# Patient Record
Sex: MALE | Employment: FULL TIME | URBAN - METROPOLITAN AREA
[De-identification: names, ages, dates, MRNs, and addresses within clinical notes are randomized per-mention and may not be internally consistent; named-entity substitution may affect disease eponyms.]

---

## 2017-11-07 ENCOUNTER — OFFICE VISIT (OUTPATIENT)
Dept: URGENT CARE | Facility: CLINIC | Age: 37
End: 2017-11-07
Payer: COMMERCIAL

## 2017-11-07 VITALS
WEIGHT: 275 LBS | SYSTOLIC BLOOD PRESSURE: 118 MMHG | HEIGHT: 73 IN | HEART RATE: 74 BPM | BODY MASS INDEX: 36.45 KG/M2 | OXYGEN SATURATION: 96 % | DIASTOLIC BLOOD PRESSURE: 76 MMHG | TEMPERATURE: 98 F | RESPIRATION RATE: 20 BRPM

## 2017-11-07 DIAGNOSIS — R05.9 COUGH: Primary | ICD-10-CM

## 2017-11-07 DIAGNOSIS — J06.9 UPPER RESPIRATORY TRACT INFECTION, UNSPECIFIED TYPE: ICD-10-CM

## 2017-11-07 LAB
CTP QC/QA: YES
CTP QC/QA: YES
FLUAV AG NPH QL: NEGATIVE
FLUBV AG NPH QL: NEGATIVE
S PYO RRNA THROAT QL PROBE: NEGATIVE

## 2017-11-07 PROCEDURE — 99203 OFFICE O/P NEW LOW 30 MIN: CPT | Mod: 25,S$GLB,, | Performed by: EMERGENCY MEDICINE

## 2017-11-07 PROCEDURE — 96372 THER/PROPH/DIAG INJ SC/IM: CPT | Mod: S$GLB,,, | Performed by: EMERGENCY MEDICINE

## 2017-11-07 PROCEDURE — 87804 INFLUENZA ASSAY W/OPTIC: CPT | Mod: QW,S$GLB,, | Performed by: EMERGENCY MEDICINE

## 2017-11-07 PROCEDURE — 87880 STREP A ASSAY W/OPTIC: CPT | Mod: QW,S$GLB,, | Performed by: EMERGENCY MEDICINE

## 2017-11-07 RX ORDER — BETAMETHASONE SODIUM PHOSPHATE AND BETAMETHASONE ACETATE 3; 3 MG/ML; MG/ML
6 INJECTION, SUSPENSION INTRA-ARTICULAR; INTRALESIONAL; INTRAMUSCULAR; SOFT TISSUE ONCE
Status: COMPLETED | OUTPATIENT
Start: 2017-11-07 | End: 2017-11-07

## 2017-11-07 RX ADMIN — BETAMETHASONE SODIUM PHOSPHATE AND BETAMETHASONE ACETATE 6 MG: 3; 3 INJECTION, SUSPENSION INTRA-ARTICULAR; INTRALESIONAL; INTRAMUSCULAR; SOFT TISSUE at 09:11

## 2017-11-07 NOTE — PROGRESS NOTES
"Subjective:       Patient ID: Calixto Clayton is a 37 y.o. male.    Vitals:  height is 6' 1" (1.854 m) and weight is 124.7 kg (275 lb). His oral temperature is 97.8 °F (36.6 °C). His blood pressure is 118/76 and his pulse is 74. His respiration is 20 and oxygen saturation is 96%.     Chief Complaint: Sore Throat    Cough, sore throat, headaches and body aches x 4 days.  Cough production sputum is yellow in color.  Pt has tried mucinex, dayquil and ibuprofen otc with mild relief.  Pt reports no fever, n/v/d.  Pt has not had flu vaccine this year.  He would like a steroid shot. Doesn't need cough meds. He is taking OTC meds. He is here with his kids.      Sore Throat    This is a new problem. The current episode started in the past 7 days. The problem has been gradually worsening. There has been no fever. The pain is at a severity of 4/10. The pain is mild. Associated symptoms include congestion, coughing, headaches and a hoarse voice. Pertinent negatives include no abdominal pain, ear pain or shortness of breath. He has tried NSAIDs (mucinex, dayquil) for the symptoms.     Review of Systems   Constitution: Negative for chills, fever and malaise/fatigue.   HENT: Positive for congestion, hoarse voice and sore throat. Negative for ear pain.    Eyes: Negative for discharge and redness.   Cardiovascular: Negative for chest pain, dyspnea on exertion and leg swelling.   Respiratory: Positive for cough and sputum production. Negative for shortness of breath and wheezing.    Musculoskeletal: Positive for myalgias.   Gastrointestinal: Negative for abdominal pain and nausea.   Neurological: Positive for headaches.       Objective:      Physical Exam   Constitutional: He is oriented to person, place, and time. He appears well-developed and well-nourished. He is cooperative.  Non-toxic appearance. He does not appear ill. No distress (mild cough).   HENT:   Head: Normocephalic and atraumatic.   Right Ear: Hearing, tympanic " membrane, external ear and ear canal normal.   Left Ear: Hearing, tympanic membrane, external ear and ear canal normal.   Nose: Mucosal edema and rhinorrhea present. No nasal deformity. No epistaxis. Right sinus exhibits no maxillary sinus tenderness and no frontal sinus tenderness. Left sinus exhibits no maxillary sinus tenderness and no frontal sinus tenderness.   Mouth/Throat: Uvula is midline and mucous membranes are normal. No trismus in the jaw. Normal dentition. No uvula swelling. Posterior oropharyngeal erythema present.   Eyes: Conjunctivae, EOM and lids are normal. Pupils are equal, round, and reactive to light. No scleral icterus.   Sclera clear bilat   Neck: Trachea normal, normal range of motion, full passive range of motion without pain and phonation normal. Neck supple.   Cardiovascular: Normal rate, regular rhythm, normal heart sounds, intact distal pulses and normal pulses.    Pulmonary/Chest: Effort normal and breath sounds normal. No respiratory distress.   Abdominal: Soft. Normal appearance and bowel sounds are normal. He exhibits no distension. There is no tenderness.   Musculoskeletal: Normal range of motion. He exhibits no edema or deformity.   Neurological: He is alert and oriented to person, place, and time. He exhibits normal muscle tone. Coordination normal.   Skin: Skin is warm, dry and intact. He is not diaphoretic. No pallor.   Psychiatric: He has a normal mood and affect. His speech is normal and behavior is normal. Judgment and thought content normal. Cognition and memory are normal.   Nursing note and vitals reviewed.      Office Visit on 11/07/2017   Component Date Value Ref Range Status    Rapid Strep A Screen 11/07/2017 Negative  Negative Final     Acceptable 11/07/2017 Yes   Final    Rapid Influenza A Ag 11/07/2017 Negative  Negative Final    Rapid Influenza B Ag 11/07/2017 Negative  Negative Final     Acceptable 11/07/2017 Yes   Final       Assessment:       1. Cough    2. Upper respiratory tract infection, unspecified type        Plan:         Cough  -     POCT rapid strep A  -     POCT Influenza A/B    Upper respiratory tract infection, unspecified type    Other orders  -     betamethasone acetate-betamethasone sodium phosphate injection 6 mg; Inject 1 mL (6 mg total) into the muscle once.

## 2017-11-29 ENCOUNTER — OFFICE VISIT (OUTPATIENT)
Dept: URGENT CARE | Facility: CLINIC | Age: 37
End: 2017-11-29
Payer: COMMERCIAL

## 2017-11-29 VITALS
WEIGHT: 275 LBS | SYSTOLIC BLOOD PRESSURE: 124 MMHG | HEIGHT: 73 IN | TEMPERATURE: 99 F | BODY MASS INDEX: 36.45 KG/M2 | OXYGEN SATURATION: 97 % | DIASTOLIC BLOOD PRESSURE: 78 MMHG | HEART RATE: 66 BPM | RESPIRATION RATE: 16 BRPM

## 2017-11-29 DIAGNOSIS — J02.9 SORE THROAT: Primary | ICD-10-CM

## 2017-11-29 LAB
CTP QC/QA: YES
S PYO RRNA THROAT QL PROBE: NEGATIVE

## 2017-11-29 PROCEDURE — 87880 STREP A ASSAY W/OPTIC: CPT | Mod: QW,S$GLB,, | Performed by: EMERGENCY MEDICINE

## 2017-11-29 PROCEDURE — 99214 OFFICE O/P EST MOD 30 MIN: CPT | Mod: S$GLB,,, | Performed by: EMERGENCY MEDICINE

## 2017-11-29 NOTE — PROGRESS NOTES
Subjective:       Patient ID: Calixto Clayton is a 37 y.o. male.    Vitals:    11/29/17 1642   BP: 124/78   Pulse: 66   Resp: 16   Temp: 98.7 °F (37.1 °C)       Chief Complaint: Sore Throat    Pt states sore throat and congestion x 2 days. Pt.'s 5 yr old daughter was diagnosed with strep today.      Sore Throat    This is a new problem. The current episode started in the past 7 days. The problem has been gradually worsening. The pain is at a severity of 5/10. Associated symptoms include congestion, coughing, a hoarse voice and trouble swallowing. Pertinent negatives include no abdominal pain, ear pain, headaches or shortness of breath. He has had exposure to strep. Treatments tried: sudafed. The treatment provided mild relief.     Review of Systems   Constitution: Negative for chills, fever and malaise/fatigue.   HENT: Positive for congestion, hoarse voice, sore throat and trouble swallowing. Negative for ear pain.    Eyes: Negative for discharge and redness.   Cardiovascular: Negative for chest pain, dyspnea on exertion and leg swelling.   Respiratory: Positive for cough. Negative for shortness of breath, sputum production and wheezing.    Musculoskeletal: Negative for myalgias.   Gastrointestinal: Negative for abdominal pain and nausea.   Neurological: Negative for headaches.       Objective:      Physical Exam   Constitutional: He is oriented to person, place, and time. He appears well-developed and well-nourished. He is cooperative.  Non-toxic appearance. He does not appear ill. No distress.   Mild pain NAD   HENT:   Head: Normocephalic and atraumatic.   Right Ear: Hearing, tympanic membrane, external ear and ear canal normal.   Left Ear: Hearing, tympanic membrane, external ear and ear canal normal.   Nose: No mucosal edema, rhinorrhea or nasal deformity. No epistaxis. Right sinus exhibits no maxillary sinus tenderness and no frontal sinus tenderness. Left sinus exhibits no maxillary sinus tenderness and no  "frontal sinus tenderness.   Mouth/Throat: Uvula is midline and mucous membranes are normal. No trismus in the jaw. Normal dentition. No uvula swelling. Posterior oropharyngeal erythema present. Tonsils are 1+ on the right. Tonsils are 1+ on the left.   Eyes: Conjunctivae, EOM and lids are normal. Pupils are equal, round, and reactive to light. No scleral icterus.   Sclera clear bilat   Neck: Trachea normal, normal range of motion, full passive range of motion without pain and phonation normal. Neck supple.   Cardiovascular: Normal rate, regular rhythm, normal heart sounds, intact distal pulses and normal pulses.    Pulmonary/Chest: Effort normal and breath sounds normal. No respiratory distress.   Abdominal: Soft. Normal appearance and bowel sounds are normal. He exhibits no distension. There is no tenderness.   Musculoskeletal: Normal range of motion. He exhibits no edema or deformity.   Lymphadenopathy:     He has cervical adenopathy.        Right cervical: Superficial cervical adenopathy present.        Left cervical: Superficial cervical adenopathy present.   Neurological: He is alert and oriented to person, place, and time. He exhibits normal muscle tone. Coordination normal.   Skin: Skin is warm, dry and intact. He is not diaphoretic. No pallor.   Psychiatric: He has a normal mood and affect. His speech is normal and behavior is normal. Judgment and thought content normal. Cognition and memory are normal.   Nursing note and vitals reviewed.    /78   Pulse 66   Temp 98.7 °F (37.1 °C)   Resp 16   Ht 6' 1" (1.854 m)   Wt 124.7 kg (275 lb)   SpO2 97%   BMI 36.28 kg/m²    Office Visit on 11/29/2017   Component Date Value Ref Range Status    Rapid Strep A Screen 11/29/2017 Negative  Negative Final     Acceptable 11/29/2017 Yes   Final     Assessment:       1. Sore throat        Plan:       Calixto was seen today for sore throat.    Diagnoses and all orders for this visit:    Sore throat  - "     POCT rapid strep A

## 2017-11-29 NOTE — PATIENT INSTRUCTIONS
Pharyngitis   If your condition worsens or fails to improve we recommend that you receive another evaluation at the ER immediately or contact your PCP to discuss your concerns or return here. You must understand that you've received an urgent care treatment only and that you may be released before all your medical problems are known or treated. You the patient will arrange for followup care as instructed.   The majority of all sore throats or tonsillitis are viral and antibiotics will not treat this.     Tylenol or ibuprofen for pain may help as long as you are not allergic to these meds or have a medical condition such as stomach ulcers, liver or kidney disease or taking blood thinners etc that would   prevent you from using these medications.   Rest and fluids will help as well.   If you were prescribed antibiotics and are female and on BCP use additional methods to prevent pregnancy while on the antibiotics and for one cycle after

## 2022-04-01 ENCOUNTER — HOSPITAL ENCOUNTER (EMERGENCY)
Facility: HOSPITAL | Age: 42
Discharge: HOME | End: 2022-04-01
Attending: EMERGENCY MEDICINE
Payer: COMMERCIAL

## 2022-04-01 VITALS
SYSTOLIC BLOOD PRESSURE: 123 MMHG | DIASTOLIC BLOOD PRESSURE: 63 MMHG | OXYGEN SATURATION: 98 % | WEIGHT: 230 LBS | HEIGHT: 72 IN | TEMPERATURE: 99 F | HEART RATE: 62 BPM | RESPIRATION RATE: 15 BRPM | BODY MASS INDEX: 31.15 KG/M2

## 2022-04-01 DIAGNOSIS — L03.213 PERIORBITAL CELLULITIS OF RIGHT EYE: Primary | ICD-10-CM

## 2022-04-01 PROCEDURE — 99284 PR EMERGENCY DEPT VISIT,LEVEL IV: ICD-10-PCS | Mod: ,,, | Performed by: EMERGENCY MEDICINE

## 2022-04-01 PROCEDURE — 99283 EMERGENCY DEPT VISIT LOW MDM: CPT

## 2022-04-01 PROCEDURE — 99284 EMERGENCY DEPT VISIT MOD MDM: CPT | Mod: ,,, | Performed by: EMERGENCY MEDICINE

## 2022-04-01 PROCEDURE — 25000003 PHARM REV CODE 250: Performed by: EMERGENCY MEDICINE

## 2022-04-01 RX ORDER — AMOXICILLIN AND CLAVULANATE POTASSIUM 875; 125 MG/1; MG/1
1 TABLET, FILM COATED ORAL 2 TIMES DAILY
Qty: 13 TABLET | Refills: 0 | Status: ON HOLD | OUTPATIENT
Start: 2022-04-01 | End: 2022-04-04 | Stop reason: SDUPTHER

## 2022-04-01 RX ORDER — CLINDAMYCIN HYDROCHLORIDE 150 MG/1
300 CAPSULE ORAL
Status: COMPLETED | OUTPATIENT
Start: 2022-04-01 | End: 2022-04-01

## 2022-04-01 RX ORDER — CLINDAMYCIN HYDROCHLORIDE 300 MG/1
300 CAPSULE ORAL EVERY 8 HOURS
Qty: 20 CAPSULE | Refills: 0 | Status: ON HOLD | OUTPATIENT
Start: 2022-04-01 | End: 2022-04-04 | Stop reason: SDUPTHER

## 2022-04-01 RX ORDER — AMOXICILLIN AND CLAVULANATE POTASSIUM 875; 125 MG/1; MG/1
1 TABLET, FILM COATED ORAL
Status: COMPLETED | OUTPATIENT
Start: 2022-04-01 | End: 2022-04-01

## 2022-04-01 RX ADMIN — CLINDAMYCIN HYDROCHLORIDE 300 MG: 150 CAPSULE ORAL at 09:04

## 2022-04-01 RX ADMIN — AMOXICILLIN AND CLAVULANATE POTASSIUM 1 TABLET: 875; 125 TABLET, FILM COATED ORAL at 09:04

## 2022-04-01 NOTE — DISCHARGE INSTRUCTIONS
Return to the ER for any worsening eyelid swelling or pain, eye redness or blurry vision, fevers, or pain with movement of your eye.

## 2022-04-01 NOTE — ED NOTES
Patient identifiers verified and correct for Mr Clayton  C/C: Swelling to right upper eye lid SEE NN  APPEARANCE: awake and alert in NAD.  SKIN: warm, dry and intact. No breakdown or bruising.  MUSCULOSKELETAL: Patient moving all extremities spontaneously, no obvious swelling or deformities noted. Ambulates independently.  RESPIRATORY: Denies shortness of breath.Respirations unlabored.   CARDIAC: Denies CP, 2+ distal pulses; no peripheral edema  ABDOMEN: S/ND/NT, Denies nausea  : voids spontaneously, denies difficulty  Neurologic: AAO x 4; follows commands equal strength in all extremities; denies numbness/tingling. Denies dizziness Deneis wekaness

## 2022-04-01 NOTE — ED NOTES
Discharge home , states understanding to follow up as directed. Ambulates out of ED without difficulty. RX given, Med before disharge

## 2022-04-01 NOTE — ED PROVIDER NOTES
Encounter Date: 4/1/2022       History     Chief Complaint   Patient presents with    Eye Problem     42-year-old male with no significant past medical history presents for evaluation of right upper eyelid swelling and redness that he noticed this morning.  He states he had a few styes on his upper right eyelid and 1 on his lower right eyelid that he noticed 6 days ago, and they were increasing in size since onset.  Last night they started draining, and this morning he noticed diffuse swelling of his right upper eyelid with redness and mild pain.  Denies any blurry vision, fevers, or other complaints.  He is otherwise healthy and takes no medications regularly.  No history of similar previous episodes.        Review of patient's allergies indicates:   Allergen Reactions    Sulfa (sulfonamide antibiotics)      History reviewed. No pertinent past medical history.  Past Surgical History:   Procedure Laterality Date    left forearm surgery      SPINE SURGERY       Family History   Problem Relation Age of Onset    Diabetes Mother      Social History     Tobacco Use    Smoking status: Former Smoker    Smokeless tobacco: Never Used   Substance Use Topics    Alcohol use: Yes     Comment: socially    Drug use: No     Review of Systems   Constitutional: Negative for fever.   HENT: Negative for congestion.    Eyes: Negative for redness.   Respiratory: Negative for shortness of breath.    Cardiovascular: Negative for chest pain.   Gastrointestinal: Negative for abdominal pain.   Genitourinary: Negative for dysuria.   Skin: Negative for rash.   Neurological: Negative for headaches.   Psychiatric/Behavioral: Negative for confusion.       Physical Exam     Initial Vitals [04/01/22 0844]   BP Pulse Resp Temp SpO2   123/63 62 15 99 °F (37.2 °C) 98 %      MAP       --         Physical Exam    Nursing note and vitals reviewed.  Constitutional: He appears well-developed and well-nourished. He is not diaphoretic. No distress.    HENT:   Head: Normocephalic and atraumatic.   Eyes: Conjunctivae and EOM are normal. Pupils are equal, round, and reactive to light. Right eye exhibits no discharge. Left eye exhibits no discharge. No scleral icterus.   Right upper eyelid with diffuse erythema and mild tenderness, no focal fluctuance or sign of abscess.  Right eye with no conjunctival erythema or drainage, full painless EOMI   Neck: Neck supple.   Normal range of motion.  Cardiovascular: Normal rate, regular rhythm, normal heart sounds and intact distal pulses.   No murmur heard.  Pulmonary/Chest: Breath sounds normal. No respiratory distress. He has no wheezes. He has no rhonchi. He has no rales.   Musculoskeletal:         General: No edema. Normal range of motion.      Cervical back: Normal range of motion and neck supple.     Neurological: He is alert and oriented to person, place, and time. He has normal strength. No cranial nerve deficit.   Skin: Skin is warm and dry.   Psychiatric: He has a normal mood and affect.         ED Course   Procedures  Labs Reviewed   HIV 1 / 2 ANTIBODY   HEPATITIS C ANTIBODY          Imaging Results    None          Medications   clindamycin capsule 300 mg (300 mg Oral Given 4/1/22 0940)   amoxicillin-clavulanate 875-125mg per tablet 1 tablet (1 tablet Oral Given 4/1/22 0940)     Medical Decision Making:   Initial Assessment:       Healthy 42-year-old male presents for evaluation of right upper eyelid swelling and redness that he noticed this morning.  He had multiple upper and lower eyelid stye onset about 6 days ago; lower eyelids stye resolved but upper eyelid stye increased in size and started draining last night, with onset of diffuse swelling this morning.  He denies any associated vision changes, eye redness or drainage, or blurry vision.  No fevers or other associated complaints.  On exam patient with diffuse upper eyelid erythema and edema, with no conjunctival erythema, blurry vision, or painful EOMI.   No palpable stye of upper eyelid or active drainage, no focal fluctuance to suggest underlying abscess.    Most likely periorbital cellulitis of upper eyelid, no symptoms or exam findings to suggest orbital cellulitis.  Patient is afebrile and well-appearing with no comorbidities to warrant further emergent workup, no indication for CT or labs at this time.  Per most recent recommendations, will start Augmentin and clindamycin for sinus and MRSA coverage.  Patient understands expected clinical course while taking antibiotics, and to return to the ED for any worsening eyelid swelling or redness, fevers, blurry vision, or pain with movement of the eye.                        Clinical Impression:   Final diagnoses:  [L03.213] Periorbital cellulitis of right eye (Primary)          ED Disposition Condition    Discharge Stable        ED Prescriptions     Medication Sig Dispense Start Date End Date Auth. Provider    amoxicillin-clavulanate 875-125mg (AUGMENTIN) 875-125 mg per tablet Take 1 tablet by mouth 2 (two) times daily. 13 tablet 4/1/2022  Thee Rutledge MD    clindamycin (CLEOCIN) 300 MG capsule Take 1 capsule (300 mg total) by mouth every 8 (eight) hours. 20 capsule 4/1/2022  Thee Rutledge MD        Follow-up Information     Follow up With Specialties Details Why Contact Info    [facility] - Emergency Dept Emergency Medicine Go to  If symptoms worsen [id number]  [facility] [id number] [phone]           Thee Rutledge MD  04/01/22 0921

## 2022-04-01 NOTE — ED NOTES
"Patient staets swelling to top right eyelid onset this am, sensitive to light, satets snoted sty last week, " burst " last night  "

## 2022-04-03 ENCOUNTER — HOSPITAL ENCOUNTER (OUTPATIENT)
Facility: HOSPITAL | Age: 42
Discharge: HOME | End: 2022-04-04
Attending: EMERGENCY MEDICINE | Admitting: INTERNAL MEDICINE
Payer: COMMERCIAL

## 2022-04-03 DIAGNOSIS — R07.9 CHEST PAIN: ICD-10-CM

## 2022-04-03 DIAGNOSIS — L03.213 PRESEPTAL CELLULITIS OF RIGHT EYE: Primary | ICD-10-CM

## 2022-04-03 PROBLEM — H57.89 DISCHARGE OF EYE, RIGHT: Status: ACTIVE | Noted: 2022-04-03

## 2022-04-03 PROBLEM — H57.11 EYE PAIN, RIGHT: Status: ACTIVE | Noted: 2022-04-03

## 2022-04-03 LAB
BASOPHILS # BLD AUTO: 0.02 K/UL (ref 0–0.2)
BASOPHILS NFR BLD: 0.3 % (ref 0–1.9)
BUN SERPL-MCNC: 12 MG/DL (ref 6–30)
CHLORIDE SERPL-SCNC: 104 MMOL/L (ref 95–110)
CREAT SERPL-MCNC: 0.9 MG/DL (ref 0.5–1.4)
CTP QC/QA: YES
DIFFERENTIAL METHOD: NORMAL
EOSINOPHIL # BLD AUTO: 0.4 K/UL (ref 0–0.5)
EOSINOPHIL NFR BLD: 5.9 % (ref 0–8)
ERYTHROCYTE [DISTWIDTH] IN BLOOD BY AUTOMATED COUNT: 12.8 % (ref 11.5–14.5)
GLUCOSE SERPL-MCNC: 103 MG/DL (ref 70–110)
HCT VFR BLD AUTO: 45.1 % (ref 40–54)
HCT VFR BLD CALC: 46 %PCV (ref 36–54)
HGB BLD-MCNC: 15.4 G/DL (ref 14–18)
IMM GRANULOCYTES # BLD AUTO: 0.02 K/UL (ref 0–0.04)
IMM GRANULOCYTES NFR BLD AUTO: 0.3 % (ref 0–0.5)
LYMPHOCYTES # BLD AUTO: 1.8 K/UL (ref 1–4.8)
LYMPHOCYTES NFR BLD: 29.7 % (ref 18–48)
MCH RBC QN AUTO: 30.7 PG (ref 27–31)
MCHC RBC AUTO-ENTMCNC: 34.1 G/DL (ref 32–36)
MCV RBC AUTO: 90 FL (ref 82–98)
MONOCYTES # BLD AUTO: 0.6 K/UL (ref 0.3–1)
MONOCYTES NFR BLD: 10.4 % (ref 4–15)
NEUTROPHILS # BLD AUTO: 3.2 K/UL (ref 1.8–7.7)
NEUTROPHILS NFR BLD: 53.4 % (ref 38–73)
NRBC BLD-RTO: 0 /100 WBC
PLATELET # BLD AUTO: 245 K/UL (ref 150–450)
PMV BLD AUTO: 9.7 FL (ref 9.2–12.9)
POC IONIZED CALCIUM: 1.16 MMOL/L (ref 1.06–1.42)
POC TCO2 (MEASURED): 25 MMOL/L (ref 23–29)
POTASSIUM BLD-SCNC: 4.7 MMOL/L (ref 3.5–5.1)
RBC # BLD AUTO: 5.01 M/UL (ref 4.6–6.2)
SAMPLE: NORMAL
SARS-COV-2 RDRP RESP QL NAA+PROBE: NEGATIVE
SODIUM BLD-SCNC: 138 MMOL/L (ref 136–145)
WBC # BLD AUTO: 6.06 K/UL (ref 3.9–12.7)

## 2022-04-03 PROCEDURE — G0378 HOSPITAL OBSERVATION PER HR: HCPCS

## 2022-04-03 PROCEDURE — A4216 STERILE WATER/SALINE, 10 ML: HCPCS | Performed by: INTERNAL MEDICINE

## 2022-04-03 PROCEDURE — 85025 COMPLETE CBC W/AUTO DIFF WBC: CPT | Performed by: PHYSICIAN ASSISTANT

## 2022-04-03 PROCEDURE — 63600175 PHARM REV CODE 636 W HCPCS: Performed by: PHYSICIAN ASSISTANT

## 2022-04-03 PROCEDURE — 63600175 PHARM REV CODE 636 W HCPCS: Performed by: INTERNAL MEDICINE

## 2022-04-03 PROCEDURE — 96375 TX/PRO/DX INJ NEW DRUG ADDON: CPT | Mod: 59

## 2022-04-03 PROCEDURE — 25000003 PHARM REV CODE 250: Performed by: INTERNAL MEDICINE

## 2022-04-03 PROCEDURE — 1157F ADVNC CARE PLAN IN RCRD: CPT | Mod: CPTII,,, | Performed by: INTERNAL MEDICINE

## 2022-04-03 PROCEDURE — 99220 PR INITIAL OBSERVATION CARE,LEVL III: CPT | Mod: ,,, | Performed by: INTERNAL MEDICINE

## 2022-04-03 PROCEDURE — 99220 PR INITIAL OBSERVATION CARE,LEVL III: ICD-10-PCS | Mod: ,,, | Performed by: INTERNAL MEDICINE

## 2022-04-03 PROCEDURE — U0002 COVID-19 LAB TEST NON-CDC: HCPCS | Performed by: INTERNAL MEDICINE

## 2022-04-03 PROCEDURE — 96366 THER/PROPH/DIAG IV INF ADDON: CPT

## 2022-04-03 PROCEDURE — 99285 EMERGENCY DEPT VISIT HI MDM: CPT | Mod: CS,,, | Performed by: PHYSICIAN ASSISTANT

## 2022-04-03 PROCEDURE — 25500020 PHARM REV CODE 255: Performed by: EMERGENCY MEDICINE

## 2022-04-03 PROCEDURE — 96367 TX/PROPH/DG ADDL SEQ IV INF: CPT

## 2022-04-03 PROCEDURE — 99285 EMERGENCY DEPT VISIT HI MDM: CPT | Mod: 25

## 2022-04-03 PROCEDURE — 99285 PR EMERGENCY DEPT VISIT,LEVEL V: ICD-10-PCS | Mod: CS,,, | Performed by: PHYSICIAN ASSISTANT

## 2022-04-03 PROCEDURE — 80047 BASIC METABLC PNL IONIZED CA: CPT

## 2022-04-03 PROCEDURE — 1157F PR ADVANCE CARE PLAN OR EQUIV PRESENT IN MEDICAL RECORD: ICD-10-PCS | Mod: CPTII,,, | Performed by: INTERNAL MEDICINE

## 2022-04-03 PROCEDURE — 25000003 PHARM REV CODE 250: Performed by: PHYSICIAN ASSISTANT

## 2022-04-03 PROCEDURE — 96365 THER/PROPH/DIAG IV INF INIT: CPT | Mod: 59

## 2022-04-03 RX ORDER — IPRATROPIUM BROMIDE AND ALBUTEROL SULFATE 2.5; .5 MG/3ML; MG/3ML
3 SOLUTION RESPIRATORY (INHALATION) EVERY 6 HOURS PRN
Status: DISCONTINUED | OUTPATIENT
Start: 2022-04-03 | End: 2022-04-04 | Stop reason: HOSPADM

## 2022-04-03 RX ORDER — ENOXAPARIN SODIUM 100 MG/ML
40 INJECTION SUBCUTANEOUS EVERY 24 HOURS
Status: DISCONTINUED | OUTPATIENT
Start: 2022-04-03 | End: 2022-04-04 | Stop reason: HOSPADM

## 2022-04-03 RX ORDER — ACETAMINOPHEN 325 MG/1
650 TABLET ORAL EVERY 4 HOURS PRN
Status: DISCONTINUED | OUTPATIENT
Start: 2022-04-03 | End: 2022-04-04 | Stop reason: HOSPADM

## 2022-04-03 RX ORDER — GLUCAGON 1 MG
1 KIT INJECTION
Status: DISCONTINUED | OUTPATIENT
Start: 2022-04-03 | End: 2022-04-04 | Stop reason: HOSPADM

## 2022-04-03 RX ORDER — IBUPROFEN 200 MG
16 TABLET ORAL
Status: DISCONTINUED | OUTPATIENT
Start: 2022-04-03 | End: 2022-04-04 | Stop reason: HOSPADM

## 2022-04-03 RX ORDER — TALC
6 POWDER (GRAM) TOPICAL NIGHTLY PRN
Status: DISCONTINUED | OUTPATIENT
Start: 2022-04-03 | End: 2022-04-04 | Stop reason: HOSPADM

## 2022-04-03 RX ORDER — NALOXONE HCL 0.4 MG/ML
0.02 VIAL (ML) INJECTION
Status: DISCONTINUED | OUTPATIENT
Start: 2022-04-03 | End: 2022-04-04 | Stop reason: HOSPADM

## 2022-04-03 RX ORDER — ZINC GLUCONATE 50 MG
50 TABLET ORAL DAILY
COMMUNITY
End: 2023-05-19

## 2022-04-03 RX ORDER — SODIUM CHLORIDE 0.9 % (FLUSH) 0.9 %
10 SYRINGE (ML) INJECTION EVERY 8 HOURS
Status: DISCONTINUED | OUTPATIENT
Start: 2022-04-03 | End: 2022-04-04 | Stop reason: HOSPADM

## 2022-04-03 RX ORDER — KETOROLAC TROMETHAMINE 30 MG/ML
10 INJECTION, SOLUTION INTRAMUSCULAR; INTRAVENOUS
Status: COMPLETED | OUTPATIENT
Start: 2022-04-03 | End: 2022-04-03

## 2022-04-03 RX ORDER — IBUPROFEN 200 MG
24 TABLET ORAL
Status: DISCONTINUED | OUTPATIENT
Start: 2022-04-03 | End: 2022-04-04 | Stop reason: HOSPADM

## 2022-04-03 RX ORDER — POLYETHYLENE GLYCOL 3350 17 G/17G
17 POWDER, FOR SOLUTION ORAL 2 TIMES DAILY PRN
Status: DISCONTINUED | OUTPATIENT
Start: 2022-04-03 | End: 2022-04-04 | Stop reason: HOSPADM

## 2022-04-03 RX ADMIN — IOHEXOL 75 ML: 350 INJECTION, SOLUTION INTRAVENOUS at 08:04

## 2022-04-03 RX ADMIN — Medication 10 ML: at 11:04

## 2022-04-03 RX ADMIN — CEFTRIAXONE 2 G: 2 INJECTION, SOLUTION INTRAVENOUS at 10:04

## 2022-04-03 RX ADMIN — VANCOMYCIN HYDROCHLORIDE 2000 MG: 10 INJECTION, POWDER, LYOPHILIZED, FOR SOLUTION INTRAVENOUS at 11:04

## 2022-04-03 RX ADMIN — KETOROLAC TROMETHAMINE 10 MG: 30 INJECTION, SOLUTION INTRAMUSCULAR at 08:04

## 2022-04-03 RX ADMIN — Medication 6 MG: at 11:04

## 2022-04-03 RX ADMIN — VANCOMYCIN HYDROCHLORIDE 1500 MG: 1.5 INJECTION, POWDER, LYOPHILIZED, FOR SOLUTION INTRAVENOUS at 11:04

## 2022-04-03 NOTE — ED NOTES
Pt presents with superior eyelid edema, erythema. Reports right facial pain. Came to ER Friday and started on two PO antibiotics. Reports symptoms are worsening and not improving.

## 2022-04-03 NOTE — ED NOTES
I-STAT Chem-8+ Results:   Value Reference Range   Sodium 138 136-145 mmol/L   Potassium  4.7 3.5-5.1 mmol/L   Chloride 104  mmol/L   Ionized Calcium 1.16 1.06-1.42 mmol/L   CO2 (measured) 25 23-29 mmol/L   Glucose 103  mg/dL   BUN 12 6-30 mg/dL   Creatinine 0.9 0.5-1.4 mg/dL   Hematocrit 46 36-54%

## 2022-04-03 NOTE — PROGRESS NOTES
Pharmacokinetic Initial Assessment: IV Vancomycin    Assessment/Plan:    -Patient given vancomycin 2000 mg IV x1 (19 mg/kg) in the ED.  -Cellulitis. Goal 10-15 mcg/mL.   -ISTAT - Scr 0.9 mg/dL. Est. CG CrCl = 159 mL/min.     -Start vancomycin 1500 mg IV Q12H (15 mg/kg) at 23:00 tonight.   -Trough on 4/4 @ 22:00.     Pharmacy will continue to follow and monitor vancomycin.      Please contact pharmacy at extension [phone] with any questions regarding this assessment.     Thank you for the consult,   Marcelo Parra       Patient brief summary:  Calixto Clayton is a 42 y.o. male initiated on antimicrobial therapy with IV Vancomycin for treatment of suspected skin & soft tissue infection    Drug Allergies:   Review of patient's allergies indicates:   Allergen Reactions    Sulfa (sulfonamide antibiotics)        Actual Body Weight:   104.3 kg    Renal Function:   CrCl cannot be calculated (Patient's most recent lab result is older than the maximum 7 days allowed.).,     Dialysis Method (if applicable):  N/A    CBC (last 72 hours):  Recent Labs   Lab Result Units 04/03/22  0821   WBC K/uL 6.06   Hemoglobin g/dL 15.4   Hematocrit % 45.1   Platelets K/uL 245   Gran % % 53.4   Lymph % % 29.7   Mono % % 10.4   Eosinophil % % 5.9   Basophil % % 0.3   Differential Method  Automated       Metabolic Panel (last 72 hours):  No results for input(s): SODIUM, POTASSIUM, CHLORIDE, CO2, GLUCOSE, BUN BLD, CREATININE, ALBUMIN, BILIRUBIN TOTAL, ALK PHOS, AST, ALT, MAGNESIUM, PHOSPHORUS in the last 72 hours.    Drug levels (last 3 results):  No results for input(s): VANCOMYCINRA, VANCOMYCINPE, VANCOMYCINTR in the last 72 hours.    Microbiologic Results:  Microbiology Results (last 7 days)     ** No results found for the last 168 hours. **

## 2022-04-03 NOTE — ED PROVIDER NOTES
Encounter Date: 4/3/2022       History     Chief Complaint   Patient presents with    Eye Problem     Here Friday, seen ophthalmology and was told he has infection of the R eye. Given antibiotics. Was told to come back if eye got worse, pain and swelling worse     Healthy 42-year-old male who presents to the emergency department chief complaint of worsening swelling to the right eyelid.  Reports swelling began 2 days ago.  He was evaluated in the emergency department at that time.  He was diagnosed with preseptal cellulitis and started on Augmentin and clindamycin.  Reports compliance with the antibiotics but states that the swelling has progressively worsened.  He denies vision changes but does note some pain with extraocular movements.  He denies other worsening or alleviating factors.        Review of patient's allergies indicates:   Allergen Reactions    Sulfa (sulfonamide antibiotics)      No past medical history on file.  Past Surgical History:   Procedure Laterality Date    left forearm surgery      SPINE SURGERY       Family History   Problem Relation Age of Onset    Diabetes Mother      Social History     Tobacco Use    Smoking status: Former Smoker    Smokeless tobacco: Never Used   Substance Use Topics    Alcohol use: Yes     Comment: socially    Drug use: No     Review of Systems   Constitutional: Negative for fever.   HENT: Negative for sore throat.    Eyes: Positive for pain.   Respiratory: Negative for shortness of breath.    Cardiovascular: Negative for chest pain.   Gastrointestinal: Negative for nausea.   Genitourinary: Negative for dysuria.   Musculoskeletal: Negative for back pain.   Skin: Negative for rash.   Neurological: Negative for weakness.   Hematological: Does not bruise/bleed easily.       Physical Exam     Initial Vitals [04/03/22 0737]   BP Pulse Resp Temp SpO2   133/68 (!) 59 17 98.4 °F (36.9 °C) 99 %      MAP       --         Physical Exam    Nursing note and vitals  reviewed.  Constitutional: He appears well-developed and well-nourished. He is not diaphoretic. No distress.   HENT:   Head: Normocephalic and atraumatic.   Mouth/Throat: Oropharynx is clear and moist.   Eyes: EOM are normal. Pupils are equal, round, and reactive to light.   Diffuse swelling and erythema noted to the right eyelid. No discernible abscess of fluctuance.    Neck: Neck supple.   Normal range of motion.  Cardiovascular: Normal rate, regular rhythm, normal heart sounds and intact distal pulses. Exam reveals no gallop and no friction rub.    No murmur heard.  Pulmonary/Chest: Breath sounds normal. He has no wheezes. He has no rhonchi. He has no rales.   Abdominal: Abdomen is soft. Bowel sounds are normal. There is no abdominal tenderness.   Musculoskeletal:         General: Normal range of motion.      Cervical back: Normal range of motion and neck supple.     Neurological: He is alert and oriented to person, place, and time. He has normal strength. GCS score is 15. GCS eye subscore is 4. GCS verbal subscore is 5. GCS motor subscore is 6.   Skin: Skin is warm and dry. Capillary refill takes less than 2 seconds.   Psychiatric: He has a normal mood and affect. His behavior is normal. Judgment and thought content normal.         ED Course   Procedures  Labs Reviewed   CBC W/ AUTO DIFFERENTIAL   SARS-COV-2 RDRP GENE    Narrative:     This test utilizes isothermal nucleic acid amplification   technology to detect the SARS-CoV-2 RdRp nucleic acid segment.   The analytical sensitivity (limit of detection) is 125 genome   equivalents/mL.   A POSITIVE result implies infection with the SARS-CoV-2 virus;   the patient is presumed to be contagious.     A NEGATIVE result means that SARS-CoV-2 nucleic acids are not   present above the limit of detection. A NEGATIVE result should be   treated as presumptive. It does not rule out the possibility of   COVID-19 and should not be the sole basis for treatment decisions.   If  COVID-19 is strongly suspected based on clinical and exposure   history, re-testing using an alternate molecular assay should be   considered.   This test is only for use under the Food and Drug   Administration s Emergency Use Authorization (EUA).   Commercial kits are provided by [employer].   Performance characteristics of the EUA have been independently   verified by [facility] Department of   Pathology and Laboratory Medicine.   _________________________________________________________________   The authorized Fact Sheet for Healthcare Providers and the authorized Fact   Sheet for Patients of the ID NOW COVID-19 are available on the FDA   website:     https://www.fda.gov/media/812296/download  https://www.fda.gov/media/984161/download          ISTAT PROCEDURE   ISTAT CHEM8          Imaging Results          CT ORBITS WITH CONTRAST (Final result)  Result time 04/03/22 09:17:55    Final result by Rolly Sherwood MD (04/03/22 09:17:55)                 Impression:      The findings are consistent with preseptal cellulitis on the right without postseptal involvement.  No infiltration of the retro or extraconal fat is identified.  No subperiosteal or other abscess.    Presumed chronic nasal and frontal process of maxilla fractures.      Electronically signed by: Rolly Sherwood  Date:    04/03/2022  Time:    09:17             Narrative:    EXAMINATION:  CT ORBITS WITH CONTRAST    CLINICAL HISTORY:  Orbital cellulitis suspected;    TECHNIQUE:  Postcontrast CT imaging of the orbits was performed with sagittal coronal reformats.  75 mL omni 350 intravenous contrast was utilized.    COMPARISON:  None    FINDINGS:  There is prominent preseptal soft tissue swelling overlying the right orbit.  The retro bulbar and extraconal fat is clear with no evidence of postseptal involvement.  There is a small retention cyst within the right maxillary sinus with very mild scattered ethmoid mucosal thickening and  minimal right maxillary mucosal thickening at the ostium..  No subperiosteal abscess or other is identified.    This superior ophthalmic veins are patent and not enlarged.    Presumed chronic nasal and frontal process of maxilla fractures.  No acute orbital fracture.    The visualized portions of the middle ears and mastoid air cells are clear.                                 Medications   sodium chloride 0.9% flush 10 mL (10 mLs Intravenous Not Given 4/3/22 1400)   albuterol-ipratropium 2.5 mg-0.5 mg/3 mL nebulizer solution 3 mL (has no administration in time range)   melatonin tablet 6 mg (has no administration in time range)   polyethylene glycol packet 17 g (has no administration in time range)   acetaminophen tablet 650 mg (has no administration in time range)   naloxone 0.4 mg/mL injection 0.02 mg (has no administration in time range)   glucose chewable tablet 16 g (has no administration in time range)   glucose chewable tablet 24 g (has no administration in time range)   glucagon (human recombinant) injection 1 mg (has no administration in time range)   enoxaparin injection 40 mg (40 mg Subcutaneous Not Given 4/3/22 1700)   cefTRIAXone (ROCEPHIN) 2 g/50 mL D5W IVPB (has no administration in time range)   dextrose 10% bolus 125 mL (has no administration in time range)   dextrose 10% bolus 250 mL (has no administration in time range)   vancomycin - pharmacy to dose (has no administration in time range)   vancomycin 1.5 g in dextrose 5 % 250 mL IVPB (ready to mix) (has no administration in time range)   ketorolac injection 9.999 mg (9.999 mg Intravenous Given 4/3/22 0824)   iohexoL (OMNIPAQUE 350) injection 75 mL (75 mLs Intravenous Given 4/3/22 0858)   vancomycin 2 g in dextrose 5 % 500 mL IVPB (0 mg/kg × 104.3 kg Intravenous Stopped 4/3/22 1344)   cefTRIAXone (ROCEPHIN) 2 g/50 mL D5W IVPB (0 g Intravenous Stopped 4/3/22 1132)     Medical Decision Making:   History:   Old Medical Records: I decided to obtain  old medical records.  Initial Assessment:   Emergent evaluation of a 42-year-old male who presents to the emergency department with chief complaint of worsening swelling to the right eyelid that began 2 days ago.  Currently taking Augmentin and clindamycin for preseptal cellulitis.  Patient is afebrile, hemodynamically stable, nontoxic appearing.  Differential Diagnosis:   DDx includes but is not limited to preseptal cellulitis, orbital cellulitis, hordeolum, chalazion.   Clinical Tests:   Lab Tests: Ordered and Reviewed  Radiological Study: Ordered and Reviewed  ED Management:  No severe metabolic or hematologic derangements.  CT orbits: The findings are consistent with preseptal cellulitis on the right without postseptal involvement.  No infiltration of the retro or extraconal fat is identified.  No subperiosteal or other abscess.     Patient with persistent and worsening swelling to the eyelid, despite appropriate antibiotic coverage. Discussed with ophtho. They did not formally see the patient but agree with admission, vanc, rocephin.   Will admit to Medicine for IV antibiotics and further workup and treatment.   Patient agreeable with plan. All questions answered.   The patient's history, physical exam, and plan of care was discussed with and agreed upon with my supervising physician.             Attending Attestation:     Physician Attestation Statement for NP/PA:   I discussed this assessment and plan of this patient with the NP/PA, but I did not personally examine the patient. The face to face encounter was performed by the NP/PA.              ED Course as of 04/03/22 1630   Sun Apr 03, 2022   0843 WBC: 6.06 [JM]   0843 Hemoglobin: 15.4 [JM]   0843 Hematocrit: 45.1 [JM]   0843 Platelets: 245 [JM]   0843 POC BUN: 12 [JM]   0843 POC Creatinine: 0.9 [JM]      ED Course User Index  [JM] Brooklyn Greenberg PA-C             Clinical Impression:   Final diagnoses:  [L03.213] Preseptal cellulitis of right eye  (Primary)          ED Disposition Condition    Observation               Brooklyn Greenberg PA-C  04/03/22 7198

## 2022-04-03 NOTE — H&P
[facility] - Emergency Dept  [facility] Medicine  History & Physical    Patient Name: Calixto Clayton  MRN: [id number]  Patient Class: OP- Observation  Admission Date: 4/3/2022  Attending Physician: David Gomez MD   Primary Care Provider: Sarah Mills MD (Inactive)    Patient information was obtained from patient, past medical records and ER records.     Subjective:     Principal Problem:Preseptal cellulitis of right upper eyelid    Chief Complaint:   Chief Complaint   Patient presents with    Eye Problem     Here Friday, seen ophthalmology and was told he has infection of the R eye. Given antibiotics. Was told to come back if eye got worse, pain and swelling worse        HPI: 42-year-old male with no significant past medical history presents for worsening right upper eye lid swelling and redness. Symptoms started about 8 days ago when he noticed few styes in his upper and lower eye lids. Lower eyelid stye resolved but upper eyelid stye increased in size and around 3/31 it started draining, and subsequent morning 4/1 he noticed diffuse swelling of his right upper eyelid with redness and mild pain. He presented to the ED 4/1, and was diagnosed with periorbital cellulitis with no evidence of orbital cellulitis. He was discharged on Augmentin and clindamycin. His swelling and redness worsening despite oral abx , prompting him to come back to the ED 4/3. He denied any fevers, blurry vision, HA, eye redness, pain with eye movement.     In the ED, he was stable. Labs unremarkable. CT orbital with preseptal cellulitis. Ophthalmology curbsided by ED, rec rocephin and vancomycin. Admitted to  for further mgmt.         No past medical history on file.    Past Surgical History:   Procedure Laterality Date    left forearm surgery      SPINE SURGERY         Review of patient's allergies indicates:   Allergen Reactions    Sulfa (sulfonamide antibiotics)        No current facility-administered medications on file  prior to encounter.     Current Outpatient Medications on File Prior to Encounter   Medication Sig    (Magic mouthwash) 1:1:1 Benadryl 12.5mg/5ml liq, aluminum & magnesium hydroxide-simehticone (Maalox), lidocaine viscous 2% 10 cc swish and spit every 4 hours as needed for mouth sores or sore throat    amitriptyline (ELAVIL) 10 MG tablet 1-5  tabs p.o. Q.6 p.m. May increase by adding 1 tablet every 7 days if needed.    amoxicillin-clavulanate 875-125mg (AUGMENTIN) 875-125 mg per tablet Take 1 tablet by mouth 2 (two) times daily.    clindamycin (CLEOCIN) 300 MG capsule Take 1 capsule (300 mg total) by mouth every 8 (eight) hours.    ibuprofen (ADVIL,MOTRIN) 200 MG tablet Take 200 mg by mouth every 6 (six) hours as needed for Pain.     Family History       Problem Relation (Age of Onset)    Diabetes Mother          Tobacco Use    Smoking status: Former Smoker    Smokeless tobacco: Never Used   Substance and Sexual Activity    Alcohol use: Yes     Comment: socially    Drug use: No    Sexual activity: Not on file     Review of Systems   Constitutional:  Negative for chills, fatigue and fever.   HENT:  Negative for ear discharge and ear pain.    Eyes:  Positive for pain and discharge. Negative for photophobia, redness, itching and visual disturbance.        Upper eye lid swelling and erythema   Respiratory: Negative.  Negative for cough and shortness of breath.    Cardiovascular: Negative.  Negative for chest pain, palpitations and leg swelling.   Gastrointestinal: Negative.    Genitourinary: Negative.    Musculoskeletal: Negative.    Neurological: Negative.    Hematological: Negative.    Psychiatric/Behavioral: Negative.     Objective:     Vital Signs (Most Recent):  Temp: 97.8 °F (36.6 °C) (04/03/22 1138)  Pulse: (!) 42 (04/03/22 1138)  Resp: 18 (04/03/22 1138)  BP: 116/64 (04/03/22 1138)  SpO2: 100 % (04/03/22 1138)   Vital Signs (24h Range):  Temp:  [97.8 °F (36.6 °C)-98.4 °F (36.9 °C)] 97.8 °F (36.6  °C)  Pulse:  [42-59] 42  Resp:  [16-18] 18  SpO2:  [98 %-100 %] 100 %  BP: (116-133)/(63-68) 116/64     Weight: 104.3 kg (230 lb)  Body mass index is 31.19 kg/m².    Physical Exam  Vitals and nursing note reviewed.   Constitutional:       Appearance: Normal appearance.   HENT:      Head: Normocephalic and atraumatic.      Mouth/Throat:      Mouth: Mucous membranes are moist.      Pharynx: Oropharynx is clear.   Eyes:      Extraocular Movements: Extraocular movements intact.      Conjunctiva/sclera: Conjunctivae normal.      Pupils: Pupils are equal, round, and reactive to light.      Comments: R Upper eye lid swelling and erythema. No fluctuance. No drainage noted (although endorsed by patient)    Cardiovascular:      Rate and Rhythm: Normal rate and regular rhythm.      Heart sounds: Normal heart sounds. No murmur heard.    No gallop.   Pulmonary:      Effort: Pulmonary effort is normal.      Breath sounds: Normal breath sounds.   Abdominal:      General: Abdomen is flat. Bowel sounds are normal.      Palpations: Abdomen is soft.   Musculoskeletal:         General: Normal range of motion.      Cervical back: Normal range of motion.   Skin:     General: Skin is warm and dry.      Capillary Refill: Capillary refill takes less than 2 seconds.   Neurological:      General: No focal deficit present.      Mental Status: He is alert and oriented to person, place, and time. Mental status is at baseline.   Psychiatric:         Mood and Affect: Mood normal.         Behavior: Behavior normal.         Thought Content: Thought content normal.         Judgment: Judgment normal.       CRANIAL NERVES     CN III, IV, VI   Pupils are equal, round, and reactive to light.     Significant Labs: All pertinent labs within the past 24 hours have been reviewed.    Significant Imaging: I have reviewed all pertinent imaging results/findings within the past 24 hours.    Assessment/Plan:     * Preseptal cellulitis of right upper eyelid  -  "presents with upper eye lid erythema and edema , worsening despite oral abx at home  - continue to have discharge and pain in upper eye lid  - no pain with eye movement, no overt abscess felt, no conjunctival injection   - CT with "preseptal cellulitis on the right without postseptal involvement.  No infiltration of the retro or extraconal fat is identified.  No subperiosteal or other abscess."  - ED spoke to ophthalmology, rec IV abx and admit for observation  - given rocephin and vancomycin in the ED, continue  - ID consulted  - warm compress to right eye TID  - monitor for any conjunctival pathology, progression or vision changes      Eye pain, right  - as above      Discharge of eye, right  - as above        VTE Risk Mitigation (From admission, onward)         Ordered     enoxaparin injection 40 mg  Daily         04/03/22 1230     IP VTE HIGH RISK PATIENT  Once         04/03/22 1230     Place sequential compression device  Until discontinued         04/03/22 1230                   David Gomez MD  Department of Hospital Medicine   [facility] - Emergency Dept  "

## 2022-04-03 NOTE — SUBJECTIVE & OBJECTIVE
No past medical history on file.    Past Surgical History:   Procedure Laterality Date    left forearm surgery      SPINE SURGERY         Review of patient's allergies indicates:   Allergen Reactions    Sulfa (sulfonamide antibiotics)        No current facility-administered medications on file prior to encounter.     Current Outpatient Medications on File Prior to Encounter   Medication Sig    (Magic mouthwash) 1:1:1 Benadryl 12.5mg/5ml liq, aluminum & magnesium hydroxide-simehticone (Maalox), lidocaine viscous 2% 10 cc swish and spit every 4 hours as needed for mouth sores or sore throat    amitriptyline (ELAVIL) 10 MG tablet 1-5  tabs p.o. Q.6 p.m. May increase by adding 1 tablet every 7 days if needed.    amoxicillin-clavulanate 875-125mg (AUGMENTIN) 875-125 mg per tablet Take 1 tablet by mouth 2 (two) times daily.    clindamycin (CLEOCIN) 300 MG capsule Take 1 capsule (300 mg total) by mouth every 8 (eight) hours.    ibuprofen (ADVIL,MOTRIN) 200 MG tablet Take 200 mg by mouth every 6 (six) hours as needed for Pain.     Family History       Problem Relation (Age of Onset)    Diabetes Mother          Tobacco Use    Smoking status: Former Smoker    Smokeless tobacco: Never Used   Substance and Sexual Activity    Alcohol use: Yes     Comment: socially    Drug use: No    Sexual activity: Not on file     Review of Systems   Constitutional:  Negative for chills, fatigue and fever.   HENT:  Negative for ear discharge and ear pain.    Eyes:  Positive for pain and discharge. Negative for photophobia, redness, itching and visual disturbance.        Upper eye lid swelling and erythema   Respiratory: Negative.  Negative for cough and shortness of breath.    Cardiovascular: Negative.  Negative for chest pain, palpitations and leg swelling.   Gastrointestinal: Negative.    Genitourinary: Negative.    Musculoskeletal: Negative.    Neurological: Negative.    Hematological: Negative.    Psychiatric/Behavioral:  Negative.     Objective:     Vital Signs (Most Recent):  Temp: 97.8 °F (36.6 °C) (04/03/22 1138)  Pulse: (!) 42 (04/03/22 1138)  Resp: 18 (04/03/22 1138)  BP: 116/64 (04/03/22 1138)  SpO2: 100 % (04/03/22 1138)   Vital Signs (24h Range):  Temp:  [97.8 °F (36.6 °C)-98.4 °F (36.9 °C)] 97.8 °F (36.6 °C)  Pulse:  [42-59] 42  Resp:  [16-18] 18  SpO2:  [98 %-100 %] 100 %  BP: (116-133)/(63-68) 116/64     Weight: 104.3 kg (230 lb)  Body mass index is 31.19 kg/m².    Physical Exam  Vitals and nursing note reviewed.   Constitutional:       Appearance: Normal appearance.   HENT:      Head: Normocephalic and atraumatic.      Mouth/Throat:      Mouth: Mucous membranes are moist.      Pharynx: Oropharynx is clear.   Eyes:      Extraocular Movements: Extraocular movements intact.      Conjunctiva/sclera: Conjunctivae normal.      Pupils: Pupils are equal, round, and reactive to light.      Comments: R Upper eye lid swelling and erythema. No fluctuance. No drainage noted (although endorsed by patient)    Cardiovascular:      Rate and Rhythm: Normal rate and regular rhythm.      Heart sounds: Normal heart sounds. No murmur heard.    No gallop.   Pulmonary:      Effort: Pulmonary effort is normal.      Breath sounds: Normal breath sounds.   Abdominal:      General: Abdomen is flat. Bowel sounds are normal.      Palpations: Abdomen is soft.   Musculoskeletal:         General: Normal range of motion.      Cervical back: Normal range of motion.   Skin:     General: Skin is warm and dry.      Capillary Refill: Capillary refill takes less than 2 seconds.   Neurological:      General: No focal deficit present.      Mental Status: He is alert and oriented to person, place, and time. Mental status is at baseline.   Psychiatric:         Mood and Affect: Mood normal.         Behavior: Behavior normal.         Thought Content: Thought content normal.         Judgment: Judgment normal.       CRANIAL NERVES     CN III, IV, VI   Pupils are equal,  round, and reactive to light.     Significant Labs: All pertinent labs within the past 24 hours have been reviewed.    Significant Imaging: I have reviewed all pertinent imaging results/findings within the past 24 hours.

## 2022-04-03 NOTE — HPI
42-year-old male with no significant past medical history presents for worsening right upper eye lid swelling and redness. Symptoms started about 8 days ago when he noticed few styes in his upper and lower eye lids. Lower eyelid stye resolved but upper eyelid stye increased in size and around 3/31 it started draining, and subsequent morning 4/1 he noticed diffuse swelling of his right upper eyelid with redness and mild pain. He presented to the ED 4/1, and was diagnosed with periorbital cellulitis with no evidence of orbital cellulitis. He was discharged on Augmentin and clindamycin. His swelling and redness worsening despite oral abx , prompting him to come back to the ED 4/3. He denied any fevers, blurry vision, HA, eye redness, pain with eye movement.     In the ED, he was stable. Labs unremarkable. CT orbital with preseptal cellulitis. Ophthalmology curbsided by ED, rec rocephin and vancomycin. Admitted to  for further mgmt.

## 2022-04-03 NOTE — ASSESSMENT & PLAN NOTE
"- presents with upper eye lid erythema and edema , worsening despite oral abx at home  - continue to have discharge and pain in upper eye lid  - no pain with eye movement, no overt abscess felt, no conjunctival injection   - CT with "preseptal cellulitis on the right without postseptal involvement.  No infiltration of the retro or extraconal fat is identified.  No subperiosteal or other abscess."  - ED spoke to ophthalmology, rec IV abx and admit for observation  - given rocephin and vancomycin in the ED, continue  - ID consulted  - warm compress to right eye TID  - monitor for any conjunctival pathology, progression or vision changes    "

## 2022-04-03 NOTE — ACP (ADVANCE CARE PLANNING)
Advance Care Planning     Date: 04/03/2022    Today a meeting took place: bedside    Patient Participation: Patient is able to participate      Staff attendees (Name and  Role): David Gomez MD    ACP Conversation (General): Understanding of current condition preseptal cellulitis    Code Status: Full Code     ACP Documents: None    Goals of care: The patient endorses that what is most important right now is to focus on curative/life-prolongation (regardless of treatment burdens)    Accordingly, we have decided that the best plan to meet the patient's goals includes continuing with treatment      Recommendations/  Follow-up tasks: None    Length of ACP   conversation in minutes: > 15 minutes

## 2022-04-04 VITALS
OXYGEN SATURATION: 99 % | HEART RATE: 53 BPM | DIASTOLIC BLOOD PRESSURE: 63 MMHG | TEMPERATURE: 98 F | BODY MASS INDEX: 33.1 KG/M2 | HEIGHT: 72 IN | WEIGHT: 244.38 LBS | SYSTOLIC BLOOD PRESSURE: 122 MMHG | RESPIRATION RATE: 18 BRPM

## 2022-04-04 PROCEDURE — G0378 HOSPITAL OBSERVATION PER HR: HCPCS

## 2022-04-04 PROCEDURE — 96366 THER/PROPH/DIAG IV INF ADDON: CPT

## 2022-04-04 PROCEDURE — 63600175 PHARM REV CODE 636 W HCPCS: Performed by: INTERNAL MEDICINE

## 2022-04-04 PROCEDURE — 99217 PR OBSERVATION CARE DISCHARGE: CPT | Mod: ,,, | Performed by: INTERNAL MEDICINE

## 2022-04-04 PROCEDURE — 99217 PR OBSERVATION CARE DISCHARGE: ICD-10-PCS | Mod: ,,, | Performed by: INTERNAL MEDICINE

## 2022-04-04 PROCEDURE — 25000003 PHARM REV CODE 250: Performed by: INTERNAL MEDICINE

## 2022-04-04 RX ORDER — CLINDAMYCIN HYDROCHLORIDE 300 MG/1
300 CAPSULE ORAL EVERY 8 HOURS
Qty: 30 CAPSULE | Refills: 0 | Status: SHIPPED | OUTPATIENT
Start: 2022-04-04 | End: 2022-04-07 | Stop reason: SDUPTHER

## 2022-04-04 RX ORDER — AMOXICILLIN AND CLAVULANATE POTASSIUM 875; 125 MG/1; MG/1
1 TABLET, FILM COATED ORAL 2 TIMES DAILY
Qty: 20 TABLET | Refills: 0 | Status: SHIPPED | OUTPATIENT
Start: 2022-04-04 | End: 2022-04-07 | Stop reason: SDUPTHER

## 2022-04-04 RX ADMIN — CEFTRIAXONE 2 G: 2 INJECTION, SOLUTION INTRAVENOUS at 08:04

## 2022-04-04 RX ADMIN — VANCOMYCIN HYDROCHLORIDE 1500 MG: 1.5 INJECTION, POWDER, LYOPHILIZED, FOR SOLUTION INTRAVENOUS at 10:04

## 2022-04-04 NOTE — HOSPITAL COURSE
Admitted to  for preseptal cellulitis that failed OP tx. CT scan reviewed, confirmed preseptal cellulitis. Admitted and started on IV abx. Symptomatically and clinically improved on IV abx. Dc home with oral abx, needs 10-14 days abx. PCP referral placed. Dc home , no needs, refilled abx.

## 2022-04-04 NOTE — DISCHARGE SUMMARY
[facility] [address]  [facility] Medicine  Discharge Summary      Patient Name: Calixto Clayton  MRN: [id number]  Patient Class: OP- Observation  Admission Date: 4/3/2022  Discharge Date and Time:  04/04/2022   Attending Physician: David Gomez MD   Discharging Provider: David Gomez MD  Primary Care Provider: Sarah Mills MD (Inactive)  [facility] Medicine Team: [facility] David Gomez MD    HPI:   42-year-old male with no significant past medical history presents for worsening right upper eye lid swelling and redness. Symptoms started about 8 days ago when he noticed few styes in his upper and lower eye lids. Lower eyelid stye resolved but upper eyelid stye increased in size and around 3/31 it started draining, and subsequent morning 4/1 he noticed diffuse swelling of his right upper eyelid with redness and mild pain. He presented to the ED 4/1, and was diagnosed with periorbital cellulitis with no evidence of orbital cellulitis. He was discharged on Augmentin and clindamycin. His swelling and redness worsening despite oral abx , prompting him to come back to the ED 4/3. He denied any fevers, blurry vision, HA, eye redness, pain with eye movement.     In the ED, he was stable. Labs unremarkable. CT orbital with preseptal cellulitis. Ophthalmology curbsided by ED, rec rocephin and vancomycin. Admitted to  for further mgmt.         * No surgery found *      Hospital Course:   Admitted to  for preseptal cellulitis that failed OP tx. CT scan reviewed, confirmed preseptal cellulitis. Admitted and started on IV abx. Symptomatically and clinically improved on IV abx. Dc home with oral abx, needs 10-14 days abx. PCP referral placed. Dc home , no needs, refilled abx.       Goals of Care Treatment Preferences:  Code Status: Full Code          What is most important right now is to focus on curative/life-prolongation (regardless of treatment burdens).  Accordingly, we have  "decided that the best plan to meet the patient's goals includes continuing with treatment.      Consults:   Consults (From admission, onward)        Status Ordering Provider     Pharmacy to dose Vancomycin consult  Once        Provider:  (Not yet assigned)   "And" Linked Group Details    Acknowledged NIKITA CARSON          No new Assessment & Plan notes have been filed under this hospital service since the last note was generated.  Service: Hospital Medicine    Final Active Diagnoses:    Diagnosis Date Noted POA    PRINCIPAL PROBLEM:  Preseptal cellulitis of right upper eyelid [L03.213] 04/03/2022 Yes    Discharge of eye, right [H57.89] 04/03/2022 Yes    Eye pain, right [H57.11] 04/03/2022 Yes      Problems Resolved During this Admission:       Discharged Condition: stable    Disposition: Home or Self Care    Follow Up:    Patient Instructions:      Ambulatory referral/consult to Internal Medicine   Standing Status: Future   Referral Priority: Routine Referral Type: Consultation   Referral Reason: Specialty Services Required   Requested Specialty: Internal Medicine   Number of Visits Requested: 1       Significant Diagnostic Studies: all labs reviewed    Pending Diagnostic Studies:     None         Medications:  Reconciled Home Medications:      Medication List      CONTINUE taking these medications    amoxicillin-clavulanate 875-125mg 875-125 mg per tablet  Commonly known as: AUGMENTIN  Take 1 tablet by mouth 2 (two) times daily. for 10 days     clindamycin 300 MG capsule  Commonly known as: CLEOCIN  Take 1 capsule (300 mg total) by mouth every 8 (eight) hours. for 10 days     ibuprofen 200 MG tablet  Commonly known as: ADVIL,MOTRIN  Take 200 mg by mouth every 6 (six) hours as needed for Pain.     multivitamin capsule  Take 1 capsule by mouth once daily.     zinc gluconate 50 mg tablet  Take 50 mg by mouth once daily.            Indwelling Lines/Drains at time of discharge:   Lines/Drains/Airways     None     "             Time spent on the discharge of patient: > 30 minutes         David Gomez MD  Department of Hospital Medicine  [facility] - Telemetry Stepdown ([place])

## 2022-04-04 NOTE — PLAN OF CARE
No distress overnight, slept at intervals, denies any pain to eye, less swelling and redness to right eye. No needs currently, verbalized ready for d/c. Call bell within reach.    Problem: Infection  Goal: Absence of Infection Signs and Symptoms  Outcome: Ongoing, Progressing     Problem: Adult Inpatient Plan of Care  Goal: Optimal Comfort and Wellbeing  Outcome: Ongoing, Progressing

## 2022-04-04 NOTE — PLAN OF CARE
[facility] - Telemetry Stepdown ([place])  Initial Discharge Assessment       Primary Care Provider: Sarah Mills MD (Inactive)    Admission Diagnosis: Chest pain [R07.9]  Preseptal cellulitis of right eye [L03.213]    Admission Date: 4/3/2022  Expected Discharge Date: 4/4/2022         Payor: BLUE CROSS BLUE SHIELD / Plan: BCBS ALL OUT OF STATE / Product Type: PPO /     Extended Emergency Contact Information  Primary Emergency Contact: Issa Clayton   [facility]  Home Phone: [phone]  Relation: Father    Discharge Plan A: (P) Home with family  Discharge Plan B: (P) Home with family      [id number] - [place], [place] - [address]  [address]  [place] [address]  Phone: [phone] Fax: [phone]    [id number] - [place], [place] - [address] AT [address]  [address]  [place] [address]  Phone: [phone] Fax: [phone]               SW completed Discharge Planning Assessment with patient via bedside. Discharge planning booklet given to patient/family and whiteboard updated with MAGGIE and phone #. All questions answered.    Patient reported that he will have transportation upon discharge.     Patient reported that he lives at home with his wife and children, and prior to hospitalization he was independent with his ADL's. Patient reported that he is not on dialysis and he does not go to a Coumadin clinic.     Patient reported that he has stairs in his home; however, he does not have difficulty going up and down them.       Keyla Alejandro LMSW  [facility]  Ext. [phone]

## 2022-04-04 NOTE — PLAN OF CARE
Pt was given and explained discharge information. Walked off floor.  Problem: Adult Inpatient Plan of Care  Goal: Plan of Care Review  Outcome: Met  Goal: Patient-Specific Goal (Individualized)  Outcome: Met  Goal: Absence of Hospital-Acquired Illness or Injury  Outcome: Met  Goal: Optimal Comfort and Wellbeing  Outcome: Met  Goal: Readiness for Transition of Care  Outcome: Met     Problem: Infection  Goal: Absence of Infection Signs and Symptoms  Outcome: Met     Problem: Fall Injury Risk  Goal: Absence of Fall and Fall-Related Injury  Outcome: Met

## 2022-04-05 NOTE — PLAN OF CARE
[facility] - Telemetry Stepdown ([facility])  Discharge Final Note    Primary Care Provider: Sarah Mills MD (Inactive)    Expected Discharge Date: 4/4/2022    Patient discharged to home via personal transportation.     Patient's bedside nurse and patient notified of the above.      Final Discharge Note (most recent)       Final Note - 04/05/22 0945          Final Note    Assessment Type Final Discharge Note (P)      Anticipated Discharge Disposition Home or Self Care (P)         Post-Acute Status    Post-Acute Authorization Other (P)      Other Status No Post-Acute Service Needs (P)                      Important Message from Medicare                 Future Appointments   Date Time Provider Department Center   4/11/2022 11:30 AM Connor Segura MD [facility]        scheduled post-discharge follow-up appointment and information added to AVS.     Keyla Alejandro LMSW  [facility]  Ext. [phone]

## 2022-04-07 RX ORDER — CLINDAMYCIN HYDROCHLORIDE 300 MG/1
300 CAPSULE ORAL EVERY 8 HOURS
Qty: 42 CAPSULE | Refills: 0 | Status: SHIPPED | OUTPATIENT
Start: 2022-04-07 | End: 2022-04-21

## 2022-04-07 RX ORDER — AMOXICILLIN AND CLAVULANATE POTASSIUM 875; 125 MG/1; MG/1
1 TABLET, FILM COATED ORAL 2 TIMES DAILY
Qty: 28 TABLET | Refills: 0 | Status: SHIPPED | OUTPATIENT
Start: 2022-04-07 | End: 2022-04-07

## 2022-04-07 RX ORDER — CLINDAMYCIN HYDROCHLORIDE 300 MG/1
300 CAPSULE ORAL EVERY 8 HOURS
Qty: 42 CAPSULE | Refills: 0 | Status: SHIPPED | OUTPATIENT
Start: 2022-04-07 | End: 2022-04-07

## 2022-04-07 RX ORDER — AMOXICILLIN AND CLAVULANATE POTASSIUM 875; 125 MG/1; MG/1
1 TABLET, FILM COATED ORAL 2 TIMES DAILY
Qty: 28 TABLET | Refills: 0 | Status: SHIPPED | OUTPATIENT
Start: 2022-04-07 | End: 2022-04-21

## 2022-12-29 PROBLEM — H57.11 EYE PAIN, RIGHT: Status: RESOLVED | Noted: 2022-04-03 | Resolved: 2022-12-29

## 2022-12-29 PROBLEM — H57.89 DISCHARGE OF EYE, RIGHT: Status: RESOLVED | Noted: 2022-04-03 | Resolved: 2022-12-29

## 2022-12-29 PROBLEM — F43.10 PTSD (POST-TRAUMATIC STRESS DISORDER): Status: ACTIVE | Noted: 2022-12-29

## 2022-12-29 NOTE — PROGRESS NOTES
FAMILY MEDICINE  [facility]    Reason for visit:   Chief Complaint   Patient presents with    Establish Care    Knee Pain    Numbness     When waking up in morning, numbness in fingers    Annual Exam         SUBJECTIVE: Calixto Clayton is a 42 y.o. male  - with history of lumbar fusion presents as a new patient to establish care, routine annual physical and discuss numbness and tingling hands, and right knee pain.  Last PCP:  None    Calixto Clayton did have a bout of periorbital cellulitis involving his right upper eyelid earlier this year.  It was treated effectively and he reports that he is not had any issues since.  He reports that he is up-to-date with his tetanus vaccination.      Reports that occasionally he wakes up with right hand numbness of his 3rd through 5th digits.  He reports that it is only occasionally and after several minutes the numbness resolves.  He does not have issues during the day.  He does not having weakness.  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  He denies any swelling.  He denies any trauma    He also reports recent right knee pain that started several months ago after he restarted running.  He would not exercise for several years and started jogging regularly.  He reports that since then he has been having some pain in his medial knee.  It seems to be improving.  He denies any swelling.  He denies any weakness.  He does note occasional clicking and locking.  Since he has stopped jogging the pain has nearly resolved and it only bothers him occasionally      Review of Systems   All other systems reviewed and are negative.    HEALTH MAINTENANCE:   Health Maintenance   Topic Date Due    Hepatitis C Screening  Never done    Lipid Panel  Never done    TETANUS VACCINE  Never done     The patient has no Health Maintenance topics of status Not Due  Health Maintenance Due   Topic Date Due    Hepatitis C Screening  Never  done    Lipid Panel  Never done    HIV Screening  Never done    TETANUS VACCINE  Never done    COVID-19 Vaccine (2 - Pfizer series) 09/14/2021    Influenza Vaccine (1) Never done       HISTORY:   Past Medical History:   Diagnosis Date    Polyneuropathy 12/22/2014    Preseptal cellulitis of right upper eyelid 4/3/2022    PTSD (post-traumatic stress disorder) 12/29/2022       Past Surgical History:   Procedure Laterality Date    left forearm surgery Left 2009    s/p trauma radial fracture    SPINE SURGERY      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       Family History   Problem Relation Age of Onset    Diabetes Mother     Parkinsonism Mother     Neuropathy Brother         Chronic demyelinating polyneuropathy    No Known Problems Daughter     No Known Problems Daughter     No Known Problems Son     No Known Problems Maternal Grandmother     Heart disease Maternal Grandfather 45    No Known Problems Paternal Grandmother     No Known Problems Paternal Grandfather        Social History     Tobacco Use    Smoking status: Former    Smokeless tobacco: Never   Substance Use Topics    Alcohol use: Yes     Comment: socially    Drug use: No       Social History     Social History Narrative    . 3 children (2022 13 yo, 10 yo and 13 yo). [place]. From [place] and lives in [facility]. Does not exercise regularly       ALLERGIES:   Review of patient's allergies indicates:   Allergen Reactions    Sulfa (sulfonamide antibiotics)        MEDS:     Current Outpatient Medications:     ibuprofen (ADVIL,MOTRIN) 200 MG tablet, Take 200 mg by mouth every 6 (six) hours as needed for Pain., Disp: , Rfl:     multivitamin capsule, Take 1 capsule by mouth once daily., Disp: , Rfl:     zinc gluconate 50 mg tablet, Take 50 mg by mouth once daily., Disp: , Rfl:       Vital signs:   Vitals:    01/03/23 1201   BP: 128/76   BP Location: Right arm   Patient Position: Sitting   BP Method: Large (Manual)   Pulse: (!) 56   SpO2: 95%   Weight: 118.3 kg (260  lb 14.6 oz)   Height: 6' (1.829 m)     Body mass index is 35.39 kg/m².  PHYSICAL EXAM:     Physical Exam  Vitals reviewed.   Constitutional:       General: He is not in acute distress.     Appearance: Normal appearance.   HENT:      Head: Normocephalic and atraumatic.      Right Ear: Tympanic membrane and ear canal normal.      Left Ear: Tympanic membrane and ear canal normal.      Nose: Nose normal.      Mouth/Throat:      Pharynx: Uvula midline.   Eyes:      Conjunctiva/sclera: Conjunctivae normal.      Pupils: Pupils are equal, round, and reactive to light.   Neck:      Thyroid: No thyromegaly.      Vascular: Normal carotid pulses. No carotid bruit or JVD.      Trachea: Trachea normal.   Cardiovascular:      Rate and Rhythm: Normal rate and regular rhythm.      Pulses: Normal pulses.           Dorsalis pedis pulses are 2+ on the right side and 2+ on the left side.      Heart sounds: Normal heart sounds. No murmur heard.    No friction rub. No gallop.   Pulmonary:      Effort: Pulmonary effort is normal.      Breath sounds: Normal breath sounds. No decreased breath sounds, wheezing, rhonchi or rales.   Abdominal:      General: Bowel sounds are normal.      Palpations: Abdomen is soft. There is no hepatomegaly.      Tenderness: There is no abdominal tenderness.   Musculoskeletal:      Right wrist: Normal.      Left wrist: Normal.      Right hand: Normal.      Left hand: Normal.      Cervical back: Neck supple.      Right hip: Normal. No tenderness. Normal range of motion. Normal strength (but resting in external rotation).      Left hip: Normal.      Right knee: No swelling, deformity, effusion or erythema. Normal range of motion.      Left knee: Normal.      Right lower leg: No edema.      Left lower leg: No edema.   Lymphadenopathy:      Cervical: No cervical adenopathy.   Skin:     General: Skin is warm.      Capillary Refill: Capillary refill takes less than 2 seconds.      Nails: There is no clubbing.    Neurological:      Mental Status: He is alert and oriented to person, place, and time.         PHQ4 = No data recorded    PERTINENT RESULTS:   No visits with results within 1 Week(s) from this visit.   Latest known visit with results is:   Admission on 04/03/2022, Discharged on 04/04/2022   Component Date Value Ref Range Status    WBC 04/03/2022 6.06  3.90 - 12.70 K/uL Final    RBC 04/03/2022 5.01  4.60 - 6.20 M/uL Final    Hemoglobin 04/03/2022 15.4  14.0 - 18.0 g/dL Final    Hematocrit 04/03/2022 45.1  40.0 - 54.0 % Final    MCV 04/03/2022 90  82 - 98 fL Final    MCH 04/03/2022 30.7  27.0 - 31.0 pg Final    MCHC 04/03/2022 34.1  32.0 - 36.0 g/dL Final    RDW 04/03/2022 12.8  11.5 - 14.5 % Final    Platelets 04/03/2022 245  150 - 450 K/uL Final    MPV 04/03/2022 9.7  9.2 - 12.9 fL Final    Immature Granulocytes 04/03/2022 0.3  0.0 - 0.5 % Final    Gran # (ANC) 04/03/2022 3.2  1.8 - 7.7 K/uL Final    Immature Grans (Abs) 04/03/2022 0.02  0.00 - 0.04 K/uL Final    Comment: Mild elevation in immature granulocytes is non specific and   can be seen in a variety of conditions including stress response,   acute inflammation, trauma and pregnancy. Correlation with other   laboratory and clinical findings is essential.      Lymph # 04/03/2022 1.8  1.0 - 4.8 K/uL Final    Mono # 04/03/2022 0.6  0.3 - 1.0 K/uL Final    Eos # 04/03/2022 0.4  0.0 - 0.5 K/uL Final    Baso # 04/03/2022 0.02  0.00 - 0.20 K/uL Final    nRBC 04/03/2022 0  0 /100 WBC Final    Gran % 04/03/2022 53.4  38.0 - 73.0 % Final    Lymph % 04/03/2022 29.7  18.0 - 48.0 % Final    Mono % 04/03/2022 10.4  4.0 - 15.0 % Final    Eosinophil % 04/03/2022 5.9  0.0 - 8.0 % Final    Basophil % 04/03/2022 0.3  0.0 - 1.9 % Final    Differential Method 04/03/2022 Automated   Final    POC Glucose 04/03/2022 103  70 - 110 mg/dL Final    POC BUN 04/03/2022 12  6 - 30 mg/dL Final    POC Creatinine 04/03/2022 0.9  0.5 - 1.4 mg/dL Final    POC Sodium 04/03/2022 138  136 - 145  mmol/L Final    POC Potassium 04/03/2022 4.7  3.5 - 5.1 mmol/L Final    POC Chloride 04/03/2022 104  95 - 110 mmol/L Final    POC TCO2 (MEASURED) 04/03/2022 25  23 - 29 mmol/L Final    POC Ionized Calcium 04/03/2022 1.16  1.06 - 1.42 mmol/L Final    POC Hematocrit 04/03/2022 46  36 - 54 %PCV Final    Sample 04/03/2022 CLIFF   Final    POC Rapid COVID 04/03/2022 Negative  Negative Final     Acceptable 04/03/2022 Yes   Final     ASSESSMENT/PLAN:  1. Encounter for general adult medical examination with abnormal findings  Overview:  - preventative health counseling  - counseling on current recommendations for colon cancer screening.  Average risk and recommend colon cancer screening starting at 45 years old  - counseling on current recommendations for shared decision making in prostate cancer screening.  Average risk and recommend discussed risks prostate cancer screening at 50 years old  - Vaccines recommended at this visit:  See below      Orders:  -     CBC Without Differential; Future; Expected date: 01/03/2023  -     Comprehensive Metabolic Panel; Future; Expected date: 01/03/2023  -     Lipid Panel; Future; Expected date: 01/03/2023  -     Hepatitis C Antibody; Future; Expected date: 01/03/2023  -     HIV 1/2 Ag/Ab (4th Gen); Future; Expected date: 01/03/2023  -     TESTOSTERONE; Future; Expected date: 01/03/2023  -     Hemoglobin A1C; Future; Expected date: 01/03/2023    2. Numbness and tingling in right hand  Overview:  - localized to his 3rd through 5th digits   -discussed that I suspect carpal tunnel   -symptoms are mild and rare issues  -discuss if any worsening to follow-up for re-evaluation      3. Carpal tunnel syndrome of right wrist  Overview:  - appears mild and is only occasional symptoms   -counseling about carpal tunnel syndrome and management      4. Acute pain of right knee  Overview:  - no gross deformity   -appears to be improving   -discuss if he would like to resume cardiovascular  exercise by jogging he should start with a walk run regimen with daily hip and knee stabilization exercises.  He does appear to have weak hip internal rotators which we discussed may be affecting his gait and increasing his risk for injury of his knee  -instructed on stabilization exercises  - discuss if does not improve, follow-up for imaging and re-evaluation      5. Class 2 obesity due to excess calories without serious comorbidity with body mass index (BMI) of 35.0 to 35.9 in adult  Overview:  - discussed recommendation for diet and cardiovascular exercise  - counseling on lifestyle modifications for risk factor reduction  - counseling on management options            ORDERS:   Orders Placed This Encounter    CBC Without Differential    Comprehensive Metabolic Panel    Lipid Panel    Hepatitis C Antibody    HIV 1/2 Ag/Ab (4th Gen)    TESTOSTERONE    Hemoglobin A1C       Vaccines recommended:  Influenza vaccine, COVID-19 booster and Tdap.  He declined vaccination and reports that he had his Tdap about 5 years ago     Follow-up in 1 pending results or sooner if any concerns.      This note is dictated using the M*Modal Fluency Direct word recognition program. There are word recognition mistakes that are occasionally missed on review.    Dr. Kim Valentine D.O.   Family Medicine

## 2023-01-01 NOTE — PROGRESS NOTES
"Received secure chat from RN:    "This pt was discharged three days ago. He was cared for by [facility]. His family member came back to report that he did not receive his a printed prescription from the nurse at discharge. It was for two oral abx. He is requesting that they be sent to his preferred pharmacy [facility]."    Rx for clindamycin and augmentin sent to pt's preferred pharmacy.      Timmy Barboza MD  Attending Physician  Department of Hospital Medicine  [facility] secure chat preferred, or [employer] ext. [phone]  4/7/2022    "
no

## 2023-01-03 ENCOUNTER — OFFICE VISIT (OUTPATIENT)
Dept: PRIMARY CARE CLINIC | Facility: CLINIC | Age: 43
End: 2023-01-03
Attending: FAMILY MEDICINE
Payer: COMMERCIAL

## 2023-01-03 VITALS
WEIGHT: 260.94 LBS | SYSTOLIC BLOOD PRESSURE: 128 MMHG | OXYGEN SATURATION: 95 % | BODY MASS INDEX: 35.34 KG/M2 | HEART RATE: 56 BPM | DIASTOLIC BLOOD PRESSURE: 76 MMHG | HEIGHT: 72 IN

## 2023-01-03 DIAGNOSIS — Z00.01 ENCOUNTER FOR GENERAL ADULT MEDICAL EXAMINATION WITH ABNORMAL FINDINGS: Primary | ICD-10-CM

## 2023-01-03 DIAGNOSIS — M25.561 ACUTE PAIN OF RIGHT KNEE: ICD-10-CM

## 2023-01-03 DIAGNOSIS — R20.2 NUMBNESS AND TINGLING IN RIGHT HAND: ICD-10-CM

## 2023-01-03 DIAGNOSIS — R20.0 NUMBNESS AND TINGLING IN RIGHT HAND: ICD-10-CM

## 2023-01-03 DIAGNOSIS — E66.09 CLASS 2 OBESITY DUE TO EXCESS CALORIES WITHOUT SERIOUS COMORBIDITY WITH BODY MASS INDEX (BMI) OF 35.0 TO 35.9 IN ADULT: ICD-10-CM

## 2023-01-03 DIAGNOSIS — G56.01 CARPAL TUNNEL SYNDROME OF RIGHT WRIST: ICD-10-CM

## 2023-01-03 PROBLEM — E66.812 CLASS 2 OBESITY DUE TO EXCESS CALORIES WITHOUT SERIOUS COMORBIDITY WITH BODY MASS INDEX (BMI) OF 35.0 TO 35.9 IN ADULT: Status: ACTIVE | Noted: 2023-01-03

## 2023-01-03 PROBLEM — L03.213 PRESEPTAL CELLULITIS OF RIGHT UPPER EYELID: Status: RESOLVED | Noted: 2022-04-03 | Resolved: 2023-01-03

## 2023-01-03 PROBLEM — F43.10 PTSD (POST-TRAUMATIC STRESS DISORDER): Status: RESOLVED | Noted: 2022-12-29 | Resolved: 2023-01-03

## 2023-01-03 PROCEDURE — 99999 PR PBB SHADOW E&M-EST. PATIENT-LVL III: CPT | Mod: PBBFAC,,, | Performed by: FAMILY MEDICINE

## 2023-01-03 PROCEDURE — 99386 PREV VISIT NEW AGE 40-64: CPT | Mod: S$GLB,,, | Performed by: FAMILY MEDICINE

## 2023-01-03 PROCEDURE — 99999 PR PBB SHADOW E&M-EST. PATIENT-LVL III: ICD-10-PCS | Mod: PBBFAC,,, | Performed by: FAMILY MEDICINE

## 2023-01-03 PROCEDURE — 99386 PR PREVENTIVE VISIT,NEW,40-64: ICD-10-PCS | Mod: S$GLB,,, | Performed by: FAMILY MEDICINE

## 2023-01-03 PROCEDURE — 99213 OFFICE O/P EST LOW 20 MIN: CPT | Mod: PBBFAC,PN | Performed by: FAMILY MEDICINE

## 2023-03-14 ENCOUNTER — LAB VISIT (OUTPATIENT)
Dept: LAB | Facility: HOSPITAL | Age: 43
End: 2023-03-14
Attending: FAMILY MEDICINE
Payer: COMMERCIAL

## 2023-03-14 DIAGNOSIS — Z00.01 ENCOUNTER FOR GENERAL ADULT MEDICAL EXAMINATION WITH ABNORMAL FINDINGS: ICD-10-CM

## 2023-03-14 LAB
ALBUMIN SERPL BCP-MCNC: 4.5 G/DL (ref 3.5–5.2)
ALP SERPL-CCNC: 67 U/L (ref 55–135)
ALT SERPL W/O P-5'-P-CCNC: 28 U/L (ref 10–44)
ANION GAP SERPL CALC-SCNC: 6 MMOL/L (ref 8–16)
AST SERPL-CCNC: 20 U/L (ref 10–40)
BILIRUB SERPL-MCNC: 0.7 MG/DL (ref 0.1–1)
BUN SERPL-MCNC: 13 MG/DL (ref 6–20)
CALCIUM SERPL-MCNC: 9.7 MG/DL (ref 8.7–10.5)
CHLORIDE SERPL-SCNC: 105 MMOL/L (ref 95–110)
CHOLEST SERPL-MCNC: 247 MG/DL (ref 120–199)
CHOLEST/HDLC SERPL: 4.1 {RATIO} (ref 2–5)
CO2 SERPL-SCNC: 28 MMOL/L (ref 23–29)
CREAT SERPL-MCNC: 1 MG/DL (ref 0.5–1.4)
ERYTHROCYTE [DISTWIDTH] IN BLOOD BY AUTOMATED COUNT: 12.9 % (ref 11.5–14.5)
EST. GFR  (NO RACE VARIABLE): >60 ML/MIN/1.73 M^2
ESTIMATED AVG GLUCOSE: 111 MG/DL (ref 68–131)
GLUCOSE SERPL-MCNC: 104 MG/DL (ref 70–110)
HBA1C MFR BLD: 5.5 % (ref 4–5.6)
HCT VFR BLD AUTO: 48.4 % (ref 40–54)
HCV AB SERPL QL IA: NORMAL
HDLC SERPL-MCNC: 60 MG/DL (ref 40–75)
HDLC SERPL: 24.3 % (ref 20–50)
HGB BLD-MCNC: 15.8 G/DL (ref 14–18)
HIV 1+2 AB+HIV1 P24 AG SERPL QL IA: NORMAL
LDLC SERPL CALC-MCNC: 167 MG/DL (ref 63–159)
MCH RBC QN AUTO: 30.3 PG (ref 27–31)
MCHC RBC AUTO-ENTMCNC: 32.6 G/DL (ref 32–36)
MCV RBC AUTO: 93 FL (ref 82–98)
NONHDLC SERPL-MCNC: 187 MG/DL
PLATELET # BLD AUTO: 278 K/UL (ref 150–450)
PMV BLD AUTO: 10.4 FL (ref 9.2–12.9)
POTASSIUM SERPL-SCNC: 4.4 MMOL/L (ref 3.5–5.1)
PROT SERPL-MCNC: 7.7 G/DL (ref 6–8.4)
RBC # BLD AUTO: 5.22 M/UL (ref 4.6–6.2)
SODIUM SERPL-SCNC: 139 MMOL/L (ref 136–145)
TESTOST SERPL-MCNC: 1453 NG/DL (ref 304–1227)
TRIGL SERPL-MCNC: 100 MG/DL (ref 30–150)
WBC # BLD AUTO: 6.89 K/UL (ref 3.9–12.7)

## 2023-03-14 PROCEDURE — 87389 HIV-1 AG W/HIV-1&-2 AB AG IA: CPT | Performed by: FAMILY MEDICINE

## 2023-03-14 PROCEDURE — 80061 LIPID PANEL: CPT | Performed by: FAMILY MEDICINE

## 2023-03-14 PROCEDURE — 36415 COLL VENOUS BLD VENIPUNCTURE: CPT | Mod: PN | Performed by: FAMILY MEDICINE

## 2023-03-14 PROCEDURE — 83036 HEMOGLOBIN GLYCOSYLATED A1C: CPT | Performed by: FAMILY MEDICINE

## 2023-03-14 PROCEDURE — 80053 COMPREHEN METABOLIC PANEL: CPT | Performed by: FAMILY MEDICINE

## 2023-03-14 PROCEDURE — 86803 HEPATITIS C AB TEST: CPT | Performed by: FAMILY MEDICINE

## 2023-03-14 PROCEDURE — 84403 ASSAY OF TOTAL TESTOSTERONE: CPT | Performed by: FAMILY MEDICINE

## 2023-03-14 PROCEDURE — 85027 COMPLETE CBC AUTOMATED: CPT | Performed by: FAMILY MEDICINE

## 2023-03-15 ENCOUNTER — PATIENT MESSAGE (OUTPATIENT)
Dept: PRIMARY CARE CLINIC | Facility: CLINIC | Age: 43
End: 2023-03-15
Payer: COMMERCIAL

## 2023-03-15 DIAGNOSIS — E78.00 PURE HYPERCHOLESTEROLEMIA: Primary | ICD-10-CM

## 2023-04-10 PROBLEM — Z00.01 ENCOUNTER FOR GENERAL ADULT MEDICAL EXAMINATION WITH ABNORMAL FINDINGS: Status: RESOLVED | Noted: 2023-01-03 | Resolved: 2023-04-10

## 2023-05-19 ENCOUNTER — OFFICE VISIT (OUTPATIENT)
Dept: URGENT CARE | Facility: CLINIC | Age: 43
End: 2023-05-19
Payer: COMMERCIAL

## 2023-05-19 VITALS
TEMPERATURE: 99 F | SYSTOLIC BLOOD PRESSURE: 107 MMHG | OXYGEN SATURATION: 96 % | WEIGHT: 240 LBS | DIASTOLIC BLOOD PRESSURE: 71 MMHG | HEART RATE: 60 BPM | BODY MASS INDEX: 32.51 KG/M2 | HEIGHT: 72 IN

## 2023-05-19 DIAGNOSIS — K04.7 DENTAL INFECTION: Primary | ICD-10-CM

## 2023-05-19 LAB
CTP QC/QA: YES
MOLECULAR STREP A: NEGATIVE

## 2023-05-19 PROCEDURE — 87651 POCT STREP A MOLECULAR: ICD-10-PCS | Mod: QW,S$GLB,, | Performed by: NURSE PRACTITIONER

## 2023-05-19 PROCEDURE — 99213 PR OFFICE/OUTPT VISIT, EST, LEVL III, 20-29 MIN: ICD-10-PCS | Mod: S$GLB,,, | Performed by: NURSE PRACTITIONER

## 2023-05-19 PROCEDURE — 99213 OFFICE O/P EST LOW 20 MIN: CPT | Mod: S$GLB,,, | Performed by: NURSE PRACTITIONER

## 2023-05-19 PROCEDURE — 87651 STREP A DNA AMP PROBE: CPT | Mod: QW,S$GLB,, | Performed by: NURSE PRACTITIONER

## 2023-05-19 RX ORDER — AMOXICILLIN AND CLAVULANATE POTASSIUM 875; 125 MG/1; MG/1
1 TABLET, FILM COATED ORAL 2 TIMES DAILY
Qty: 20 TABLET | Refills: 0 | Status: SHIPPED | OUTPATIENT
Start: 2023-05-19 | End: 2023-05-29

## 2023-05-19 NOTE — PROGRESS NOTES
Subjective:      Patient ID: Calixto Clayton is a 43 y.o. male.    Vitals:  height is 6' (1.829 m) and weight is 108.9 kg (240 lb). His temperature is 98.8 °F (37.1 °C). His blood pressure is 107/71 and his pulse is 60. His oxygen saturation is 96%.     Chief Complaint: Otalgia    Patient has right ear pain and throat pain for 3 days.  Patient has not taking any meds.  Provider note begins below    Patient states he is having right ear and right cervical lymph node pain.  Denies any dental pain.  Has been to the dentist in the last 6 months.      Constitution: Negative for sweating, fatigue and fever.   HENT:  Positive for ear pain. Negative for ear discharge.    Neck: Positive for neck pain.   Cardiovascular:  Positive for sob on exertion. Negative for chest pain.   Respiratory:  Negative for cough and shortness of breath.    Gastrointestinal:  Negative for nausea, vomiting, constipation and diarrhea.    Objective:     Physical Exam   Constitutional: He is oriented to person, place, and time.   HENT:   Head: Normocephalic and atraumatic.   Ears:   Right Ear: Hearing, tympanic membrane, external ear and ear canal normal.   Left Ear: Hearing, tympanic membrane, external ear and ear canal normal.   Mouth/Throat: Oropharynx is clear and moist and mucous membranes are normal. Dental abscesses and dental caries present.       Cardiovascular: Normal rate.   Pulmonary/Chest: Effort normal. No respiratory distress.   Abdominal: Normal appearance.   Neurological: He is alert and oriented to person, place, and time.   Skin: Skin is dry.   Psychiatric: His behavior is normal. Mood normal.           Assessment:     1. Dental infection        Plan:   TM normal   Strep test  Suspect dental related issue   Antibiotics and follow-up with that          Dental infection  -     amoxicillin-clavulanate 875-125mg (AUGMENTIN) 875-125 mg per tablet; Take 1 tablet by mouth 2 (two) times daily. for 10 days  Dispense: 20 tablet; Refill:  0  -     POCT Strep A, Molecular

## 2023-06-05 ENCOUNTER — OFFICE VISIT (OUTPATIENT)
Dept: URGENT CARE | Facility: CLINIC | Age: 43
End: 2023-06-05
Payer: COMMERCIAL

## 2023-06-05 VITALS
HEART RATE: 64 BPM | RESPIRATION RATE: 18 BRPM | HEIGHT: 72 IN | BODY MASS INDEX: 32.51 KG/M2 | OXYGEN SATURATION: 99 % | WEIGHT: 240 LBS | DIASTOLIC BLOOD PRESSURE: 73 MMHG | SYSTOLIC BLOOD PRESSURE: 123 MMHG | TEMPERATURE: 99 F

## 2023-06-05 DIAGNOSIS — J02.9 SORE THROAT: ICD-10-CM

## 2023-06-05 DIAGNOSIS — R09.82 PND (POST-NASAL DRIP): ICD-10-CM

## 2023-06-05 DIAGNOSIS — R53.81 MALAISE AND FATIGUE: Primary | ICD-10-CM

## 2023-06-05 DIAGNOSIS — R53.83 MALAISE AND FATIGUE: Primary | ICD-10-CM

## 2023-06-05 LAB
CTP QC/QA: YES
POC MOLECULAR INFLUENZA A AGN: NEGATIVE
POC MOLECULAR INFLUENZA B AGN: NEGATIVE

## 2023-06-05 PROCEDURE — 99213 OFFICE O/P EST LOW 20 MIN: CPT | Mod: S$GLB,,, | Performed by: SURGERY

## 2023-06-05 PROCEDURE — 99213 PR OFFICE/OUTPT VISIT, EST, LEVL III, 20-29 MIN: ICD-10-PCS | Mod: S$GLB,,, | Performed by: SURGERY

## 2023-06-05 PROCEDURE — 87502 POCT INFLUENZA A/B MOLECULAR: ICD-10-PCS | Mod: QW,S$GLB,, | Performed by: SURGERY

## 2023-06-05 PROCEDURE — 87502 INFLUENZA DNA AMP PROBE: CPT | Mod: QW,S$GLB,, | Performed by: SURGERY

## 2023-06-05 NOTE — PROGRESS NOTES
Subjective:      Patient ID: Calixto Clayton is a 43 y.o. male.    Vitals:  height is 6' (1.829 m) and weight is 108.9 kg (240 lb). His oral temperature is 98.5 °F (36.9 °C). His blood pressure is 123/73 and his pulse is 64. His respiration is 18 and oxygen saturation is 99%.     Chief Complaint: Sore Throat    Patient presents with c.o sore throat since Saturday night. He reports being seen her about a week ago and states he felt like had strep. Strep test neg but patient placed on antibiotics. Sxs have returned over past 2 days.  Denies pain when swallowing, exudate, or fever. He reports a pnd that is making his throat hurt.   He is not feeling well, tired, fatigued.  No known exposure. Has 3 children 10, 12 and 14 yrs old           Sore Throat   This is a new problem. Episode onset: 2 days. The problem has been unchanged. Neither side of throat is experiencing more pain than the other. There has been no fever. Pertinent negatives include no coughing. Associated symptoms comments: +pnd  . He has tried nothing for the symptoms.   HENT:  Positive for sore throat.    Respiratory:  Negative for cough.     Objective:     Physical Exam   Constitutional: He is oriented to person, place, and time. He appears well-developed. He is cooperative.  Non-toxic appearance. He does not appear ill. No distress.   HENT:   Head: Normocephalic and atraumatic.   Ears:   Right Ear: Hearing, tympanic membrane, external ear and ear canal normal.   Left Ear: Hearing, tympanic membrane, external ear and ear canal normal.   Nose: Nose normal. No mucosal edema, rhinorrhea or nasal deformity. No epistaxis. Right sinus exhibits no maxillary sinus tenderness and no frontal sinus tenderness. Left sinus exhibits no maxillary sinus tenderness and no frontal sinus tenderness.   Mouth/Throat: Uvula is midline, oropharynx is clear and moist and mucous membranes are normal. No trismus in the jaw. Normal dentition. No uvula swelling. No oropharyngeal  exudate, posterior oropharyngeal edema or posterior oropharyngeal erythema.   Eyes: Conjunctivae and lids are normal. No scleral icterus.   Neck: Trachea normal and phonation normal. Neck supple. No edema present. No erythema present. No neck rigidity present.   Cardiovascular: Normal rate, regular rhythm, normal heart sounds and normal pulses.   Pulmonary/Chest: Effort normal and breath sounds normal. No respiratory distress. He has no decreased breath sounds. He has no rhonchi.   Abdominal: Normal appearance.   Musculoskeletal: Normal range of motion.         General: No deformity. Normal range of motion.   Neurological: He is alert and oriented to person, place, and time. He exhibits normal muscle tone. Coordination normal.   Skin: Skin is warm, dry, intact, not diaphoretic and not pale.   Psychiatric: His speech is normal and behavior is normal. Judgment and thought content normal.   Nursing note and vitals reviewed.    Assessment:     1. Malaise and fatigue    2. Sore throat    3. PND (post-nasal drip)        Plan:       Malaise and fatigue  -     POCT Influenza A/B Molecular    Sore throat    PND (post-nasal drip)      Results for orders placed or performed in visit on 06/05/23   POCT Influenza A/B Molecular   Result Value Ref Range    POC Molecular Influenza A Ag Negative Negative, Not Reported    POC Molecular Influenza B Ag Negative Negative, Not Reported     Acceptable Yes      No bacterial infection.no further antibiotics recommended. Will try course of daily antihistamine flonase, rest, hydration

## 2024-04-19 ENCOUNTER — OFFICE VISIT (OUTPATIENT)
Dept: URGENT CARE | Facility: CLINIC | Age: 44
End: 2024-04-19
Payer: COMMERCIAL

## 2024-04-19 VITALS
HEIGHT: 72 IN | SYSTOLIC BLOOD PRESSURE: 111 MMHG | OXYGEN SATURATION: 98 % | WEIGHT: 240 LBS | TEMPERATURE: 98 F | DIASTOLIC BLOOD PRESSURE: 71 MMHG | HEART RATE: 57 BPM | BODY MASS INDEX: 32.51 KG/M2

## 2024-04-19 DIAGNOSIS — H61.22 IMPACTED CERUMEN OF LEFT EAR: ICD-10-CM

## 2024-04-19 DIAGNOSIS — H92.02 OTALGIA OF LEFT EAR: Primary | ICD-10-CM

## 2024-04-19 PROCEDURE — 99213 OFFICE O/P EST LOW 20 MIN: CPT | Mod: 25,S$GLB,, | Performed by: FAMILY MEDICINE

## 2024-04-19 PROCEDURE — 69209 REMOVE IMPACTED EAR WAX UNI: CPT | Mod: LT,S$GLB,, | Performed by: FAMILY MEDICINE

## 2024-04-19 NOTE — PROGRESS NOTES
"Subjective:      Patient ID: Calixto Clayton is a 44 y.o. male.    Vitals:  height is 6' (1.829 m) and weight is 108.9 kg (240 lb). His oral temperature is 97.9 °F (36.6 °C). His blood pressure is 111/71 and his pulse is 57 (abnormal). His oxygen saturation is 98%.     Chief Complaint: Otalgia    Patient present with left ear problem patient only taken some sudafed and aspirin with out much relief  patient ear feeling fullness.  Pt had gone diving in [place] 2 weeks ago -just prior to the onset of the pain. He has been trying to "Pop" the ear drum by holding his nose and blowing- without relief    Otalgia   There is pain in the left ear. This is a new problem. The current episode started 1 to 4 weeks ago. The problem occurs constantly. The problem has been gradually worsening. The pain is at a severity of 4/10. The pain is moderate. Pertinent negatives include no abdominal pain, coughing, diarrhea, ear discharge, headaches, hearing loss, neck pain, rash, rhinorrhea, sore throat or vomiting. He has tried nothing for the symptoms. The treatment provided no relief.       HENT:  Positive for ear pain. Negative for ear discharge, hearing loss and sore throat.    Neck: Negative for neck pain.   Respiratory:  Negative for cough.    Gastrointestinal:  Negative for abdominal pain, vomiting and diarrhea.   Skin:  Negative for rash.   Neurological:  Negative for headaches.      Objective:     Physical Exam   Constitutional: He is oriented to person, place, and time. He does not appear ill. No distress.   HENT:   Head: Normocephalic and atraumatic.   Ears:   Right Ear: Tympanic membrane, external ear and ear canal normal.   Left Ear: External ear normal.      Comments: Impacted cerumen which limits visualization of tm  Nose: No rhinorrhea or congestion.   Mouth/Throat: Mucous membranes are dry.   Eyes: Pupils are equal, round, and reactive to light. Extraocular movement intact   Abdominal: Normal appearance. " "  Musculoskeletal:      Cervical back: He exhibits no tenderness.   Lymphadenopathy:     He has no cervical adenopathy.   Neurological: no focal deficit. He is alert and oriented to person, place, and time.   Skin: Skin is warm and dry.   Psychiatric: His behavior is normal. Judgment and thought content normal.   Nursing note and vitals reviewed.  Ear Cerumen Removal    Date/Time: 4/19/2024 11:00 AM    Performed by: Aissatou Molina DO  Authorized by: Aissatou Molina DO    Time out: Immediately prior to procedure a "time out" was called to verify the correct patient, procedure, equipment, support staff and site/side marked as required.    Consent Done?:  Yes (Verbal)    Local anesthetic:  None  Cerumenolytics applied prior to procedure: dulcolox.    Ceruminolytic: dulcolox.  Location details:  Left ear  Procedure type: irrigation    Cerumen  Removal Results:  Cerumen completely removed  Patient tolerance:  Patient tolerated the procedure well with no immediate complications        Assessment:     1. Otalgia of left ear    2. Impacted cerumen of left ear        Plan:       Otalgia of left ear    Impacted cerumen of left ear  -     Ear wax removal    Other orders  -     Ear Cerumen Removal    Pt or guardian provided educational materials and instructions regarding their visit diagnosis.                  "

## 2025-02-24 ENCOUNTER — RESULTS FOLLOW-UP (OUTPATIENT)
Dept: PRIMARY CARE CLINIC | Facility: CLINIC | Age: 45
End: 2025-02-24

## 2025-02-24 ENCOUNTER — APPOINTMENT (OUTPATIENT)
Dept: RADIOLOGY | Facility: OTHER | Age: 45
End: 2025-02-24
Payer: COMMERCIAL

## 2025-02-24 ENCOUNTER — PATIENT MESSAGE (OUTPATIENT)
Dept: PRIMARY CARE CLINIC | Facility: CLINIC | Age: 45
End: 2025-02-24

## 2025-02-24 ENCOUNTER — OFFICE VISIT (OUTPATIENT)
Dept: PRIMARY CARE CLINIC | Facility: CLINIC | Age: 45
End: 2025-02-24
Payer: COMMERCIAL

## 2025-02-24 VITALS
SYSTOLIC BLOOD PRESSURE: 102 MMHG | OXYGEN SATURATION: 99 % | WEIGHT: 278.69 LBS | BODY MASS INDEX: 37.79 KG/M2 | HEART RATE: 55 BPM | DIASTOLIC BLOOD PRESSURE: 70 MMHG

## 2025-02-24 DIAGNOSIS — G56.23 ULNAR NEUROPATHY OF BOTH UPPER EXTREMITIES: ICD-10-CM

## 2025-02-24 DIAGNOSIS — R53.83 FATIGUE, UNSPECIFIED TYPE: ICD-10-CM

## 2025-02-24 DIAGNOSIS — M25.461 EFFUSION, RIGHT KNEE: ICD-10-CM

## 2025-02-24 DIAGNOSIS — E66.09 CLASS 2 OBESITY DUE TO EXCESS CALORIES WITHOUT SERIOUS COMORBIDITY WITH BODY MASS INDEX (BMI) OF 37.0 TO 37.9 IN ADULT: ICD-10-CM

## 2025-02-24 DIAGNOSIS — Z00.01 ENCOUNTER FOR ROUTINE ADULT MEDICAL EXAM WITH ABNORMAL FINDINGS: Primary | ICD-10-CM

## 2025-02-24 DIAGNOSIS — E66.812 CLASS 2 OBESITY DUE TO EXCESS CALORIES WITHOUT SERIOUS COMORBIDITY WITH BODY MASS INDEX (BMI) OF 37.0 TO 37.9 IN ADULT: ICD-10-CM

## 2025-02-24 DIAGNOSIS — M25.561 CHRONIC PAIN OF RIGHT KNEE: ICD-10-CM

## 2025-02-24 DIAGNOSIS — E78.00 PURE HYPERCHOLESTEROLEMIA: ICD-10-CM

## 2025-02-24 DIAGNOSIS — E78.2 MIXED HYPERLIPIDEMIA: Primary | ICD-10-CM

## 2025-02-24 DIAGNOSIS — G89.29 CHRONIC PAIN OF RIGHT KNEE: ICD-10-CM

## 2025-02-24 PROCEDURE — 3078F DIAST BP <80 MM HG: CPT | Mod: CPTII,S$GLB,,

## 2025-02-24 PROCEDURE — 1159F MED LIST DOCD IN RCRD: CPT | Mod: CPTII,S$GLB,,

## 2025-02-24 PROCEDURE — 99999 PR PBB SHADOW E&M-EST. PATIENT-LVL III: CPT | Mod: PBBFAC,,,

## 2025-02-24 PROCEDURE — 73562 X-RAY EXAM OF KNEE 3: CPT | Mod: TC,PN,RT

## 2025-02-24 PROCEDURE — 3008F BODY MASS INDEX DOCD: CPT | Mod: CPTII,S$GLB,,

## 2025-02-24 PROCEDURE — 99215 OFFICE O/P EST HI 40 MIN: CPT | Mod: S$GLB,,,

## 2025-02-24 PROCEDURE — 73562 X-RAY EXAM OF KNEE 3: CPT | Mod: 26,RT,, | Performed by: RADIOLOGY

## 2025-02-24 PROCEDURE — 3074F SYST BP LT 130 MM HG: CPT | Mod: CPTII,S$GLB,,

## 2025-02-24 NOTE — PROGRESS NOTES
INTERNAL MEDICINE  [facility]    Reason for visit:   Chief Complaint   Patient presents with    Annual Exam    Health Maintenance     HPI: Calixto Clayton is a 45 y.o. male   - with hypercholesterolemia presenting today for routine annual physical with multiple concerns.    Patient is an established patient of PCP, Dr. Kim Valentine Do. This patient is new to me.    Musculoskeletal-  He reports right knee pain that first started in 2023 while running, with associated swelling behind the knee. He takes ibuprofen for pain management, often requiring multiple doses in the morning. He has constant back pain that worsens with sitting, with a history of lumbar fusion at age 13. He has lidocaine patches but is not using them currently.    Neurological:  He experiences bilateral hand numbness approximately 3 times per week during sleep, affecting his fourth and fifth fingers. EMG in 2014 showed slowing of the ulnar nerve below the elbow however his numbness resolved when he stopped drinking alcohol. This new intermittent numbness has been present for 2-3 months. He does not find that it is triggered by any specific hand or arm positioning. He has not attempted splinting in the past. He reports one episode of facial numbness affecting the lip area without recurrence, and occasional eye twitching.    Sleep:  He reports poor sleep quality with inconsistent sleep and wake times.        Review of Systems   Constitutional:  Positive for malaise/fatigue.   Musculoskeletal:  Positive for back pain and joint pain.   Neurological:  Positive for tingling.   Psychiatric/Behavioral:  The patient has insomnia.    All other systems reviewed and are negative.      Social History     Social History Narrative    . 3 children (2022 15 yo, 10 yo and 13 yo). [place]. From [place] and lives in [facility]. Does not exercise regularly       ALLERGIES:   Review of patient's allergies indicates:   Allergen Reactions     Sulfa (sulfonamide antibiotics) Rash       MEDS:   No current outpatient medications on file prior to visit.       Vital signs:   Vitals:    02/24/25 0858   BP: 102/70   BP Location: Left arm   Patient Position: Sitting   Pulse: (!) 55   SpO2: 99%   Weight: 126.4 kg (278 lb 10.6 oz)     Body mass index is 37.79 kg/m².    PHYSICAL EXAM:     Physical Exam  Vitals reviewed.   Constitutional:       Appearance: Normal appearance. He is not ill-appearing or diaphoretic.   HENT:      Head: Normocephalic and atraumatic.      Right Ear: Tympanic membrane, ear canal and external ear normal. There is no impacted cerumen.      Left Ear: Tympanic membrane, ear canal and external ear normal. There is no impacted cerumen.      Nose: Nose normal. No congestion.      Mouth/Throat:      Mouth: Mucous membranes are moist.      Pharynx: Oropharynx is clear.   Eyes:      Conjunctiva/sclera: Conjunctivae normal.   Cardiovascular:      Rate and Rhythm: Normal rate and regular rhythm.      Pulses: Normal pulses.      Heart sounds: Normal heart sounds. No murmur heard.  Pulmonary:      Effort: Pulmonary effort is normal. No respiratory distress.      Breath sounds: Normal breath sounds. No wheezing, rhonchi or rales.   Abdominal:      General: Bowel sounds are normal. There is no distension.      Palpations: Abdomen is soft. There is no mass.      Tenderness: There is no abdominal tenderness. There is no right CVA tenderness or left CVA tenderness.      Hernia: No hernia is present.   Musculoskeletal:         General: No signs of injury. Normal range of motion.      Right knee: Swelling (posterior knee) present. Tenderness (posterior knee) present.      Right lower leg: No edema.      Left lower leg: No edema.   Skin:     General: Skin is warm and dry.      Capillary Refill: Capillary refill takes less than 2 seconds.      Coloration: Skin is not pale.      Findings: No rash.   Neurological:      Mental Status: He is alert and oriented to  person, place, and time.      Motor: No weakness.      Gait: Gait normal.   Psychiatric:         Mood and Affect: Mood normal.         Behavior: Behavior normal.         Thought Content: Thought content normal.         Judgment: Judgment normal.           PERTINENT RESULTS:   No visits with results within 1 Week(s) from this visit.   Latest known visit with results is:   Office Visit on 06/05/2023   Component Date Value Ref Range Status    POC Molecular Influenza A Ag 06/05/2023 Negative  Negative, Not Reported Final    POC Molecular Influenza B Ag 06/05/2023 Negative  Negative, Not Reported Final     Acceptable 06/05/2023 Yes   Final       ASSESSMENT/PLAN:    Assessment & Plan    IMPRESSION:  - Evaluating bilateral hand numbness and tingling, particularly in outer fingers. Ordered EMG to evaluate for nerve compression suspecting ulnar tunnel based on presentation and previous findings.  - Assessing right knee pain and swelling, possibly due to Baker's cyst. Ordered imaging to evaluate for degenerative joint disease or arthritis.  - Monitoring elevated LDL cholesterol. Will consider high-dose statin if levels remain elevated on follow-up labs.  - Investigating fatigue, weight gain, and generalized pain. Ordered thyroid function tests, vitamin D, and B12 levels.  - Noted history of lumbar fusion at age 13. Considering impact on current back pain.    HYPERLIPIDEMIA:  - Educated on lifestyle modifications to reduce LDL cholesterol, including avoiding saturated fats, processed oils, red meats, fried foods, and egg yolks.  - Discussed importance of cardiovascular exercise, recommending 150 minutes per week at huffing and puffing level.    HAND NUMBNESS:  - Explained potential causes of hand numbness, including nerve compression and circulation issues.  - EMG of bilateral upper extremities ordered.    SLEEP ISSUES:  - Patient to improve sleep hygiene to address poor sleep quality.    WEIGHT MANAGEMENT:  -  Patient to increase physical activity to address weight gain and improve energy levels.    BACK PAIN:  - Patient to be mindful of posture, especially when sitting, to alleviate back pain.    PAIN MANAGEMENT:  - Continued ibuprofen up to 800mg per dose for pain management, limiting to daily.    RIGHT KNEE PAIN:  - X-ray of right knee ordered.  - Referred to Orthopedics for evaluation of right knee pain and swelling.    LABS:  - Fasting labs ordered including lipid panel, thyroid function tests, vitamin D, and B12 levels.    FACIAL NUMBNESS:  - Contact the office if facial numbness recurs.        1. Encounter for routine adult medical exam with abnormal findings  Overview:  - preventative health counseling  - counseling on current recommendations for colon cancer screening.  Average risk and recommend colon cancer screening starting at 45 years old  - counseling on current recommendations for shared decision making in prostate cancer screening.  Average risk and recommend discussed risks prostate cancer screening at 50 years old  - Vaccines recommended at this visit:  See below      Orders:  -     CBC Auto Differential; Future; Expected date: 02/24/2025  -     Comprehensive Metabolic Panel; Future; Expected date: 02/24/2025  -     Hemoglobin A1C; Future; Expected date: 02/24/2025  -     Lipid Panel; Future; Expected date: 02/24/2025  -     TSH; Future; Expected date: 02/24/2025  -     Vitamin D; Future; Expected date: 02/24/2025  -     Vitamin B12; Future; Expected date: 02/24/2025    2. Pure hypercholesterolemia  Overview:  Lab Results   Component Value Date    LDLCALC 167.0 (H) 03/14/2023   - Discussed lifestyle modifications  - He prefers to avoid statin however he admits to less than optimal diet and exercise  - Will obtain updated fasting lipid panel to guide treatment      3. Class 2 obesity due to excess calories without serious comorbidity with body mass index (BMI) of 37.0 to 37.9 in adult  Overview:  -  discussed recommendation for diet and cardiovascular exercise  - counseling on lifestyle modifications for risk factor reduction  - counseling on management options      4. Chronic pain of right knee  Overview:  - worsening since initial evaluation with PCP in 2023  - palpable swelling to posterior with possible large Bakers cyst  - discussed use of RICE, orthopedics evaluation, intitial imaging, and likely PT to strengthen      5. Effusion, right knee  -     X-Ray Knee 3 View Right; Future; Expected date: 02/24/2025  -     Ambulatory referral/consult to Orthopedics; Future; Expected date: 03/03/2025    6. Ulnar neuropathy of both upper extremities  -     EMG W/ ULTRASOUND AND NERVE CONDUCTION TEST 2 Extremities; Future    7. Fatigue, unspecified type  -     Vitamin D; Future; Expected date: 02/24/2025  -     Vitamin B12; Future; Expected date: 02/24/2025    Health maintenance addressed: Updating labs  Vaccines recommended: Covid-19, Tetanus, Flu and he declines    No follow-ups on file.   Follow-up pending lab and test results.    ALBERTO Cancino   Internal Medicine

## 2025-02-28 ENCOUNTER — LAB VISIT (OUTPATIENT)
Dept: LAB | Facility: OTHER | Age: 45
End: 2025-02-28
Payer: COMMERCIAL

## 2025-02-28 DIAGNOSIS — R53.83 FATIGUE, UNSPECIFIED TYPE: ICD-10-CM

## 2025-02-28 DIAGNOSIS — Z00.01 ENCOUNTER FOR ROUTINE ADULT MEDICAL EXAM WITH ABNORMAL FINDINGS: ICD-10-CM

## 2025-02-28 LAB
25(OH)D3+25(OH)D2 SERPL-MCNC: 25 NG/ML (ref 30–96)
ALBUMIN SERPL BCP-MCNC: 4.3 G/DL (ref 3.5–5.2)
ALP SERPL-CCNC: 60 U/L (ref 40–150)
ALT SERPL W/O P-5'-P-CCNC: 28 U/L (ref 10–44)
ANION GAP SERPL CALC-SCNC: 8 MMOL/L (ref 8–16)
AST SERPL-CCNC: 26 U/L (ref 10–40)
BASOPHILS # BLD AUTO: 0.03 K/UL (ref 0–0.2)
BASOPHILS NFR BLD: 0.5 % (ref 0–1.9)
BILIRUB SERPL-MCNC: 1 MG/DL (ref 0.1–1)
BUN SERPL-MCNC: 11 MG/DL (ref 6–20)
CALCIUM SERPL-MCNC: 9.6 MG/DL (ref 8.7–10.5)
CHLORIDE SERPL-SCNC: 105 MMOL/L (ref 95–110)
CHOLEST SERPL-MCNC: 267 MG/DL (ref 120–199)
CHOLEST/HDLC SERPL: 5.2 {RATIO} (ref 2–5)
CO2 SERPL-SCNC: 24 MMOL/L (ref 23–29)
CREAT SERPL-MCNC: 1 MG/DL (ref 0.5–1.4)
DIFFERENTIAL METHOD BLD: NORMAL
EOSINOPHIL # BLD AUTO: 0.3 K/UL (ref 0–0.5)
EOSINOPHIL NFR BLD: 4.5 % (ref 0–8)
ERYTHROCYTE [DISTWIDTH] IN BLOOD BY AUTOMATED COUNT: 13.2 % (ref 11.5–14.5)
EST. GFR  (NO RACE VARIABLE): >60 ML/MIN/1.73 M^2
ESTIMATED AVG GLUCOSE: 111 MG/DL (ref 68–131)
GLUCOSE SERPL-MCNC: 102 MG/DL (ref 70–110)
HBA1C MFR BLD: 5.5 % (ref 4–5.6)
HCT VFR BLD AUTO: 48.4 % (ref 40–54)
HDLC SERPL-MCNC: 51 MG/DL (ref 40–75)
HDLC SERPL: 19.1 % (ref 20–50)
HGB BLD-MCNC: 16.8 G/DL (ref 14–18)
IMM GRANULOCYTES # BLD AUTO: 0.03 K/UL (ref 0–0.04)
IMM GRANULOCYTES NFR BLD AUTO: 0.5 % (ref 0–0.5)
LDLC SERPL CALC-MCNC: 178 MG/DL (ref 63–159)
LYMPHOCYTES # BLD AUTO: 2.5 K/UL (ref 1–4.8)
LYMPHOCYTES NFR BLD: 38.4 % (ref 18–48)
MCH RBC QN AUTO: 30.9 PG (ref 27–31)
MCHC RBC AUTO-ENTMCNC: 34.7 G/DL (ref 32–36)
MCV RBC AUTO: 89 FL (ref 82–98)
MONOCYTES # BLD AUTO: 0.6 K/UL (ref 0.3–1)
MONOCYTES NFR BLD: 9.9 % (ref 4–15)
NEUTROPHILS # BLD AUTO: 3 K/UL (ref 1.8–7.7)
NEUTROPHILS NFR BLD: 46.2 % (ref 38–73)
NONHDLC SERPL-MCNC: 216 MG/DL
NRBC BLD-RTO: 0 /100 WBC
PLATELET # BLD AUTO: 258 K/UL (ref 150–450)
PMV BLD AUTO: 9.4 FL (ref 9.2–12.9)
POTASSIUM SERPL-SCNC: 4.5 MMOL/L (ref 3.5–5.1)
PROT SERPL-MCNC: 8 G/DL (ref 6–8.4)
RBC # BLD AUTO: 5.43 M/UL (ref 4.6–6.2)
SODIUM SERPL-SCNC: 137 MMOL/L (ref 136–145)
TRIGL SERPL-MCNC: 190 MG/DL (ref 30–150)
TSH SERPL DL<=0.005 MIU/L-ACNC: 1.41 UIU/ML (ref 0.4–4)
VIT B12 SERPL-MCNC: 682 PG/ML (ref 210–950)
WBC # BLD AUTO: 6.46 K/UL (ref 3.9–12.7)

## 2025-02-28 PROCEDURE — 80061 LIPID PANEL: CPT

## 2025-02-28 PROCEDURE — 80053 COMPREHEN METABOLIC PANEL: CPT

## 2025-02-28 PROCEDURE — 84443 ASSAY THYROID STIM HORMONE: CPT

## 2025-02-28 PROCEDURE — 82607 VITAMIN B-12: CPT

## 2025-02-28 PROCEDURE — 82306 VITAMIN D 25 HYDROXY: CPT

## 2025-02-28 PROCEDURE — 83036 HEMOGLOBIN GLYCOSYLATED A1C: CPT

## 2025-02-28 PROCEDURE — 85025 COMPLETE CBC W/AUTO DIFF WBC: CPT

## 2025-03-03 ENCOUNTER — OFFICE VISIT (OUTPATIENT)
Dept: SPORTS MEDICINE | Facility: CLINIC | Age: 45
End: 2025-03-03
Payer: COMMERCIAL

## 2025-03-03 VITALS
WEIGHT: 274.69 LBS | HEIGHT: 72 IN | BODY MASS INDEX: 37.21 KG/M2 | HEART RATE: 82 BPM | SYSTOLIC BLOOD PRESSURE: 105 MMHG | DIASTOLIC BLOOD PRESSURE: 70 MMHG

## 2025-03-03 DIAGNOSIS — M23.200 OLD COMPLEX TEAR OF LATERAL MENISCUS OF RIGHT KNEE: ICD-10-CM

## 2025-03-03 DIAGNOSIS — M17.11 PRIMARY OSTEOARTHRITIS OF RIGHT KNEE: ICD-10-CM

## 2025-03-03 DIAGNOSIS — M25.561 CHRONIC PAIN OF RIGHT KNEE: Primary | ICD-10-CM

## 2025-03-03 DIAGNOSIS — G89.29 CHRONIC PAIN OF RIGHT KNEE: Primary | ICD-10-CM

## 2025-03-03 DIAGNOSIS — M25.461 EFFUSION, RIGHT KNEE: ICD-10-CM

## 2025-03-03 PROCEDURE — 99999 PR PBB SHADOW E&M-EST. PATIENT-LVL III: CPT | Mod: PBBFAC,,, | Performed by: PHYSICIAN ASSISTANT

## 2025-03-03 RX ORDER — BUPIVACAINE HYDROCHLORIDE 2.5 MG/ML
2 INJECTION, SOLUTION INFILTRATION; PERINEURAL
Status: DISCONTINUED | OUTPATIENT
Start: 2025-03-03 | End: 2025-03-03 | Stop reason: HOSPADM

## 2025-03-03 RX ORDER — MELOXICAM 15 MG/1
15 TABLET ORAL DAILY PRN
Qty: 30 TABLET | Refills: 1 | Status: SHIPPED | OUTPATIENT
Start: 2025-03-03

## 2025-03-03 RX ORDER — TRIAMCINOLONE ACETONIDE 40 MG/ML
40 INJECTION, SUSPENSION INTRA-ARTICULAR; INTRAMUSCULAR
Status: DISCONTINUED | OUTPATIENT
Start: 2025-03-03 | End: 2025-03-03 | Stop reason: HOSPADM

## 2025-03-03 RX ADMIN — TRIAMCINOLONE ACETONIDE 40 MG: 40 INJECTION, SUSPENSION INTRA-ARTICULAR; INTRAMUSCULAR at 10:03

## 2025-03-03 RX ADMIN — BUPIVACAINE HYDROCHLORIDE 2 ML: 2.5 INJECTION, SOLUTION INFILTRATION; PERINEURAL at 10:03

## 2025-03-03 NOTE — PROGRESS NOTES
NEW PATIENT    HISTORY OF PRESENT ILLNESS   History of Present Illness    CHIEF COMPLAINT:  - Right knee pain    HPI:  Calixto presents with ongoing right knee pain that began in 2018 following an injury. Pain is localized to the lateral aspect of the patella with associated swelling in the posterior knee. After prolonged activity, pain encompasses the entire area with a sensation of swelling. He reports constant pain that worsens with activity, stating it hurts anytime he goes for a jog or engages in similar activities. Mechanical symptoms include clicking, popping, and catching in the joint. He has been avoiding exercise and running to manage the knee pain.    An MRI in 2018 revealed a torn lateral meniscus, for which surgery was suggested, but he declined. Since then, he has managed pain with occasional ibuprofen, taking 800-1000mg in the morning, which he reports helps occasionally. He has not sought further treatment since the initial MRI in 2018.    He also mentions soreness in his calf, which he attributes to potentially altered gait due to the knee pain.    Calixto denies any instability or buckling of the knee. Calixto denies any formal medical diagnoses.    PREVIOUS TREATMENTS:  - Calixto has been avoiding exercise since 2018 when the knee issue started  - Calixto occasionally takes ibuprofen (1000mg or 800mg) in the morning, which sometimes helps  - Calixto has been icing the knee    MEDICATIONS:  - Ibuprofen: 1000mg or 800mg daily in the morning PRN, sometimes helps    WORK STATUS:  - Works as a virgen  - Job involves moving around, bending, twisting, and standing for prolonged periods  - Knee pain affects work, with simple movements like standing up from a chair with rotational movement potentially aggravating the condition               PAST MEDICAL HISTORY    Past Medical History:   Diagnosis Date    Polyneuropathy 12/22/2014    Preseptal cellulitis of right upper eyelid 4/3/2022    PTSD (post-traumatic stress  disorder) 12/29/2022       PAST SURGICAL HISTORY     Past Surgical History:   Procedure Laterality Date    left forearm surgery Left 2009    s/p trauma radial fracture    SPINE SURGERY      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       FAMILY HISTORY    Family History   Problem Relation Name Age of Onset    Diabetes Mother      Parkinsonism Mother      Neuropathy Brother          Chronic demyelinating polyneuropathy    No Known Problems Daughter      No Known Problems Daughter      No Known Problems Son      No Known Problems Maternal Grandmother      Heart disease Maternal Grandfather  45    No Known Problems Paternal Grandmother      No Known Problems Paternal Grandfather         SOCIAL HISTORY    Social History[1]    MEDICATIONS    Current Medications[2]    ALLERGIES     Review of patient's allergies indicates:   Allergen Reactions    Sulfa (sulfonamide antibiotics) Rash         REVIEW OF SYSTEMS   Constitution: Negative. Negative for chills, fever and night sweats.   HENT: Negative for congestion and headaches.    Eyes: Negative for blurred vision, left vision loss and right vision loss.   Cardiovascular: Negative for chest pain and syncope.   Respiratory: Negative for cough and shortness of breath.    Endocrine: Negative for polydipsia, polyphagia and polyuria.   Hematologic/Lymphatic: Negative for bleeding problem. Does not bruise/bleed easily.   Skin: Negative for dry skin, itching and rash.   Musculoskeletal: Negative for falls. Positive for right knee pain and swelling.  Gastrointestinal: Negative for abdominal pain and bowel incontinence.   Genitourinary: Negative for bladder incontinence and nocturia.   Neurological: Negative for disturbances in coordination, loss of balance and seizures.   Psychiatric/Behavioral: Negative for depression. The patient does not have insomnia.    Allergic/Immunologic: Negative for hives and persistent infections.     PHYSICAL EXAMINATION    Vitals: /70   Pulse 82   Ht 6'  (1.829 m)   Wt 124.6 kg (274 lb 11.1 oz)   BMI 37.26 kg/m²     General: The patient appears active and healthy with no apparent physical problems.  No disturbance of mood or affect is demonstrated. Alert and Oriented.    HEENT: Eyes normal, pupils equally round, nose normal.    Resp: Equal and symmetrical chest rises. No wheezing    CV: Regular rate    Neck: Supple; nonpainful range of motion.    Extremities: no cyanosis, clubbing, edema, or diffuse swelling.  Palpable pulses, good capillary refill of the digits.  No coolness, discoloration, edema or obvious varicosities.    Skin: no lesions noted.    Lymphatic: no detected adenopathy in the upper or lower extremities.    Neurologic: normal mental status, normal reflexes, normal gait and balance.  Patient is alert and oriented to person, place and time.  No flaccidity or spasticity is noted.  No motor or sensory deficits are noted.  Light touch is intact    Orthopaedic: KNEE EXAM - RIGHT    Inspection:   Normal skin color and appearance with no scars, no ecchymosis + effusion.      ROM:   0° - 145°.      Palpation:   There is no tenderness along medial joint line, medial plica, medial collateral ligament, pes bursa, iliotibial band, lateral collateral ligament, popliteal fossa, patellar tendon, or quadriceps tendon.  There is tenderness along the lateral joint line.    Stability: - anterior drawer, - Lachman, - pivot shift and - posterior drawer.  No instability with varus or valgus stress at 0° or 30°. Negative dial  test at 30° and 90°.    Tests:   - Stuarts test.  - patellar compression - grind test, + patellofemoral crepitus.  There is no patellar apprehension. - J Sign. - Jayde's. - patellar tilt. - Lea. Lateral patella translation  2 quadrants. Medial patella translation 2 quadrants    Motor:   Quadriceps strength is 5/5 and hamstrings strength is 5/5. Hamstrings show no tightness.      Neuro:   Distal neurovascular status is normal    Vascular: Negative  Homans and no palpable popliteal cords. 2+ pedal pulse with brisk cap refill    Gait Normal    IMAGING    X-Ray Knee 3 View Right  Narrative: EXAMINATION:  XR KNEE 3 VIEW RIGHT    CLINICAL HISTORY:  Effusion, right knee    TECHNIQUE:  AP, lateral, and Merchant views of the right knee were performed.    COMPARISON:  None    FINDINGS:  No acute fracture or dislocation seen.  Osteophyte formation with advanced narrowing lateral tibiofemoral compartment.    No soft tissue edema or significant suprapatellar joint effusion.  No radiopaque retained foreign body.  Impression: No acute osseous abnormality seen.    Osteoarthritis with at least moderate narrowing lateral tibiofemoral compartment.    Electronically signed by: Eula Golden  Date:    02/24/2025  Time:    10:35    MRI LOWER EXTREMITY JOINT WO RIGHT  Order: [id number]  Impression    Complex tear of the lateral meniscus with displaced meniscal tissue extending posteriorly towards the intercondylar notch and laterally into the lateral and inferior lateral gutter    Moderate lateral and patellofemoral compartment chondromalacia abnormalities as described above.    Electronically Signed By: Guido Wilhelm MD 10/22/2018 10:13 AM CDT  Narrative    MRI of the right knee without contrast    INDICATION: Knee pain    TECHNIQUE: Routine MRI of the knee was performed without intravenous contrast.    COMPARISON: None    FINDINGS:    Anterior cruciate ligament: Intact    Posterior cruciate ligament: Intact.    Medial collateral ligament: Intact.    Lateral collateral ligamentous complex: Intact fibers. There is mild edema adjacent to the complex, possibly related to low-grade sprain.    Patellofemoral extensor mechanism: Intact tendons. Borderline patella jose miguel.    Hoffa's fat pad: Mild edema along the infrapatellar plica.    Quadriceps fat pad: Intact    Medial lateral patellar retinacular complexes: Intact    Medial meniscus: There is intrameniscal cleavage tear within the  body of the medial meniscus without extension to an articular surface.    Lateral meniscus: There is complex tearing of the body, free edge, and anterior horn of the lateral meniscus with displaced meniscal tissue extending posteriorly towards the intercondylar notch and displaces tissue extending into the lateral and inferior lateral gutter.        Bone marrow signal and cartilage: There is probably intraosseous ganglion versus nonaggressive chondroid lesion near the footplate attachment of the PCL. This is small chondroid nonaggressive appearing lesion within the lateral proximal tibial metaphysis. There is mild chondral fibrillation and heterogeneity of the weightbearing lateral femoral condyle cartilage. There is mild chondral fibrillation and heterogeneity of the weightbearing lateral tibial plateau cartilage. There is mild underlying reactive marrow changes in the posterior aspect of lateral tibial plateau. There is chondral fibrillation and heterogeneity with fissuring of the patellar cartilage with small areas of full-thickness cartilage loss of the lateral patellar facet with underlying reactive marrow edema. There is unremarkable appearance of the medial compartment cartilage.    Joint effusion: Small amount of joint fluid.    Bursal distention : Moderate-sized popliteal cyst is noted. There is component of the cyst extending posteriorly and inferiorly superficial to the gastrocnemius muscle, incompletely visualized.  Exam End: 10/20/18 10:07    Specimen Collected: 10/22/18 09:24 Last Resulted: 10/22/18 10:13   [place]: [facility] [place] [employer]  Result Received: 04/19/24 10:55       IMPRESSION       ICD-10-CM ICD-9-CM   1. Chronic pain of right knee  M25.561 719.46    G89.29 338.29   2. Primary osteoarthritis of right knee  M17.11 715.16   3. Old complex tear of lateral meniscus of right knee  M23.200 717.40   4. Effusion, right knee  M25.461 719.06       MEDICATIONS PRESCRIBED      Meloxicam 15  mg    RECOMMENDATIONS     Assessment & Plan    PLAN SUMMARY:  - Custom  knee brace to be measured and fitted  - Administered steroid injection to knee  - Use custom  brace during daytime activities  - Take anti-inflammatory medication as needed  - Ice knee daily  - Perform daily quadriceps strengthening exercises  - Follow up in 1 month to reevaluate knee condition and treatment effectiveness  - Potential future hyaluronic acid injection if steroid injection ineffective long-term    MEDICATIONS:  - Once-daily generic anti-inflammatory medication.  - Take anti-inflammatory medication on days with increased activity or anticipated knee pain.    PROCEDURES:  - Administered steroid injection to the knee during the visit.  - Discussed potential future left knee hyaluronic acid injection if steroid injection is not effective long-term.    FOLLOW UP:  - Follow up in 1 month to reevaluate knee condition and effectiveness of treatments.  - Contact the office if steroid injection wears off quickly or is not effective.  - Custom  knee brace to be measured and fitted at the clinic.    PATIENT INSTRUCTIONS:  - Ice the knee at least daily, especially towards the end of the day.  - Use the custom  brace during the day when up and walking around.  - Perform exercises daily, focusing on strengthening the quadriceps.         HEP 62438 - Marco Lynne, instructed and demonstrated a Knee HEP. The patient then demonstrated understanding of exercises and proper technique. This program was performed for 10 minutes.       Large Joint Aspiration/Injection: R knee    Date/Time: 3/3/2025 10:00 AM    Performed by: Marco Lynne PA-C  Authorized by: Marco Lynne PA-C    Consent Done?:  Yes (Verbal)  Indications:  Pain, arthritis and joint swelling  Site marked: the procedure site was marked    Timeout: prior to procedure the correct patient, procedure, and site was verified    Prep: patient was prepped  and draped in usual sterile fashion      Local anesthesia used?: Yes    Local anesthetic:  Co-phenylcaine spray    Details:  Needle Size:  22 G  Ultrasonic Guidance for needle placement?: Yes (Ultrasound guidance was utilized for needle localization.  Dynamic visualization of the needle was continuous throughout the procedure and maintained accurate placement.  Images were saved and stored for documentation.)    Images are saved and documented.  Approach: superolateral.  Location:  Knee  Site:  R knee  Medications:  40 mg triamcinolone acetonide 40 mg/mL; 2 mL BUPivacaine 0.25% (2.5 mg/ml) 0.25 % (2.5 mg/mL)  Aspirate amount (mL):  15  Aspirate:  Clear and yellow  Patient tolerance:  Patient tolerated the procedure well with no immediate complications        All of their questions were answered.  They will call the clinic with any questions or concerns in the interim.     Should the patient's symptoms worsen, persist, or fail to improve they should return for reevaluation and I would be happy to see them back anytime.                  Marco Lynne PA-C       Please be aware that this note has been generated with the assistance of Tame voice-to-text.  Please excuse any spelling or grammatical errors.     Thank you for choosing Marco Lynne PA-C for your sports medicine care. It is our goal to provide you with exceptional care that will help keep you healthy, active, and get you back in the game.     If you felt that you received exemplary care today, please consider leaving feedback for  PITER Lynne on [employer] at [email or web address]       Please do not hesitate to reach out to us via email, phone, or MyChart with any questions, concerns, or feedback.       This note was generated with the assistance of ambient listening technology. Verbal consent was obtained by the patient and accompanying visitor(s) for the recording of patient appointment to facilitate this note.  I attest to having reviewed and edited the generated note for accuracy, though some syntax or spelling errors may persist. Please contact the author of this note for any clarification.           [1]   Social History  Socioeconomic History    Marital status:     Number of children: 3   Tobacco Use    Smoking status: Never    Smokeless tobacco: Never   Substance and Sexual Activity    Alcohol use: Yes     Comment: socially    Drug use: No    Sexual activity: Yes     Partners: Female   Social History Narrative    . 3 children (2022 13 yo, 10 yo and 11 yo). [place]. From [place] and lives in [facility]. Does not exercise regularly   [2]   Current Outpatient Medications:     meloxicam (MOBIC) 15 MG tablet, Take 1 tablet (15 mg total) by mouth daily as needed for Pain., Disp: 30 tablet, Rfl: 1